# Patient Record
Sex: FEMALE | Race: WHITE | NOT HISPANIC OR LATINO | Employment: OTHER | URBAN - METROPOLITAN AREA
[De-identification: names, ages, dates, MRNs, and addresses within clinical notes are randomized per-mention and may not be internally consistent; named-entity substitution may affect disease eponyms.]

---

## 2023-06-06 LAB — HBA1C MFR BLD HPLC: 7.2 %

## 2023-10-02 ENCOUNTER — OFFICE VISIT (OUTPATIENT)
Dept: OBGYN CLINIC | Facility: CLINIC | Age: 68
End: 2023-10-02
Payer: COMMERCIAL

## 2023-10-02 VITALS
HEIGHT: 58 IN | WEIGHT: 207 LBS | DIASTOLIC BLOOD PRESSURE: 78 MMHG | SYSTOLIC BLOOD PRESSURE: 132 MMHG | BODY MASS INDEX: 43.45 KG/M2

## 2023-10-02 DIAGNOSIS — Z12.31 ENCOUNTER FOR SCREENING MAMMOGRAM FOR MALIGNANT NEOPLASM OF BREAST: ICD-10-CM

## 2023-10-02 DIAGNOSIS — Z12.4 SCREENING FOR CERVICAL CANCER: ICD-10-CM

## 2023-10-02 DIAGNOSIS — N95.0 PMB (POSTMENOPAUSAL BLEEDING): Primary | ICD-10-CM

## 2023-10-02 PROCEDURE — 99203 OFFICE O/P NEW LOW 30 MIN: CPT | Performed by: OBSTETRICS & GYNECOLOGY

## 2023-10-02 PROCEDURE — 58100 BIOPSY OF UTERUS LINING: CPT | Performed by: OBSTETRICS & GYNECOLOGY

## 2023-10-02 RX ORDER — CARVEDILOL 3.12 MG/1
3.12 TABLET ORAL 2 TIMES DAILY
COMMUNITY
Start: 2023-07-26

## 2023-10-02 RX ORDER — CITALOPRAM HYDROBROMIDE 10 MG/1
10 TABLET ORAL DAILY
COMMUNITY
Start: 2023-09-23 | End: 2023-10-02

## 2023-10-02 RX ORDER — DIVALPROEX SODIUM 500 MG/1
500 TABLET, EXTENDED RELEASE ORAL 2 TIMES DAILY
COMMUNITY
Start: 2023-09-23

## 2023-10-02 RX ORDER — ALLOPURINOL 100 MG/1
100 TABLET ORAL 2 TIMES DAILY
COMMUNITY
Start: 2023-09-11

## 2023-10-02 RX ORDER — ESCITALOPRAM OXALATE 20 MG/1
20 TABLET ORAL DAILY
COMMUNITY

## 2023-10-02 RX ORDER — TIRZEPATIDE 5 MG/.5ML
INJECTION, SOLUTION SUBCUTANEOUS
COMMUNITY
Start: 2023-09-25

## 2023-10-02 RX ORDER — FOLIC ACID 1 MG/1
1000 TABLET ORAL DAILY
COMMUNITY
Start: 2023-04-18

## 2023-10-02 RX ORDER — DIBASIC SODIUM PHOSPHATE, MONOBASIC POTASSIUM PHOSPHATE AND MONOBASIC SODIUM PHOSPHATE 852; 155; 130 MG/1; MG/1; MG/1
1 TABLET ORAL 2 TIMES DAILY
COMMUNITY
Start: 2023-08-09

## 2023-10-02 RX ORDER — AMLODIPINE BESYLATE 2.5 MG/1
2.5 TABLET ORAL DAILY
COMMUNITY

## 2023-10-02 RX ORDER — ATORVASTATIN CALCIUM 20 MG/1
20 TABLET, FILM COATED ORAL DAILY
COMMUNITY
Start: 2023-09-15

## 2023-10-02 RX ORDER — QUETIAPINE FUMARATE 100 MG/1
100 TABLET, FILM COATED ORAL DAILY
COMMUNITY
Start: 2023-09-23

## 2023-10-02 RX ORDER — FUROSEMIDE 40 MG/1
40 TABLET ORAL DAILY
COMMUNITY
Start: 2023-08-15

## 2023-10-02 RX ORDER — TIRZEPATIDE 2.5 MG/.5ML
INJECTION, SOLUTION SUBCUTANEOUS
COMMUNITY
Start: 2023-08-30

## 2023-10-02 RX ORDER — POTASSIUM CHLORIDE 1500 MG/1
1 TABLET, EXTENDED RELEASE ORAL 2 TIMES DAILY WITH MEALS
COMMUNITY
Start: 2023-09-18

## 2023-10-02 RX ORDER — ALBUTEROL SULFATE 90 UG/1
AEROSOL, METERED RESPIRATORY (INHALATION)
COMMUNITY

## 2023-10-02 RX ORDER — GABAPENTIN 600 MG/1
600 TABLET ORAL 3 TIMES DAILY
COMMUNITY
Start: 2023-09-03

## 2023-10-02 RX ORDER — OMEPRAZOLE 20 MG/1
20 CAPSULE, DELAYED RELEASE ORAL DAILY
COMMUNITY
Start: 2023-07-05
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
## 2023-10-02 NOTE — PATIENT INSTRUCTIONS
Pelvic Ultrasound   WHAT YOU NEED TO KNOW:   What do I need to know about a pelvic ultrasound? A pelvic ultrasound is a test that uses sound waves to look at your uterus, ovaries, or other pelvic organs. It can help your healthcare provider diagnose, monitor, or treat a medical condition. It may also be done during pregnancy to see your unborn baby. A pelvic ultrasound does not expose you or your baby to radiation. How do I prepare for a pelvic ultrasound? Your healthcare provider will talk to you about the test. You will need to drink 5 to 6 glasses of water 1 to 2 hours before your test. After you drink the water, do not urinate until you are told it is okay to do so. A full bladder will help your healthcare provider see your uterus and ovaries better. What will happen during a pelvic ultrasound? You will lie on a table. Your healthcare provider will put gel on your lower abdomen. He or she will then move a device called a transducer over that area. The transducer uses sound waves to make images of your uterus, pelvic organs, or unborn baby. You may be asked to move into other positions so your healthcare provider can get better images. When he or she is done, you will be able to urinate. Your healthcare provider may also need to insert a transducer into your vagina. This is called a transvaginal ultrasound. It is done to help your healthcare provider see your uterus and ovaries better. CARE AGREEMENT:   You have the right to help plan your care. Learn about your health condition and how it may be treated. Discuss treatment options with your healthcare providers to decide what care you want to receive. You always have the right to refuse treatment. The above information is an  only. It is not intended as medical advice for individual conditions or treatments. Talk to your doctor, nurse or pharmacist before following any medical regimen to see if it is safe and effective for you.   © Copyright [employer] 2023 Information is for End User's use only and may not be sold, redistributed or otherwise used for commercial purposes.

## 2023-10-04 LAB
CYTOLOGIST CVX/VAG CYTO: NORMAL
DX ICD CODE: NORMAL
HPV GENOTYPE REFLEX: NORMAL
HPV I/H RISK 4 DNA CVX QL PROBE+SIG AMP: NEGATIVE
OTHER STN SPEC: NORMAL
PATH REPORT.FINAL DX SPEC: NORMAL
SL AMB NOTE:: NORMAL
SL AMB SPECIMEN ADEQUACY: NORMAL
SL AMB TEST METHODOLOGY: NORMAL

## 2023-10-12 LAB
PATH REPORT.COMMENTS IMP SPEC: NORMAL
PATH REPORT.SITE OF ORIGIN SPEC: NORMAL
PAYMENT PROCEDURE: NORMAL
SL AMB .: NORMAL

## 2023-10-13 ENCOUNTER — TELEPHONE (OUTPATIENT)
Dept: OBGYN CLINIC | Facility: CLINIC | Age: 68
End: 2023-10-13

## 2023-10-13 ENCOUNTER — TELEPHONE (OUTPATIENT)
Dept: HEMATOLOGY ONCOLOGY | Facility: CLINIC | Age: 68
End: 2023-10-13

## 2023-10-13 DIAGNOSIS — N95.0 PMB (POSTMENOPAUSAL BLEEDING): Primary | ICD-10-CM

## 2023-10-13 NOTE — TELEPHONE ENCOUNTER
TC to pt  Instructed her and her  son Taisha Justice that   abnormal cells were identified on the  biopsy  and  not  conclusive and   fu is needed . Pt still with vaginal bleeding  and feels she has  rectal bleeding. Changing her   pad  6 x a day. DW  pt and Eribertoolas that a referral was placed to a  gyn Oncologist  Yvan Christine at  [facility]  in  [place]. Hope line number is [phone]. US is scheduled for  next week. Taisha Justice verbalized they may  look into seeing a  gyn oncologist at  [facility]. Instructed him to  contact  her primary and  they can request records. P:t aware  fu is needed.

## 2023-10-13 NOTE — TELEPHONE ENCOUNTER
Patient Call    Who are you speaking with? Child    If it is not the patient, are they listed on an active communication consent form? Yes   What is the reason for this call? He called to schedule appt with gyn onc. I advised pt chart is under review and we will call to schedule when clinical staff is done reviewing   Does this require a call back? N/A   If a call back is required, please list best call back number N/a   If a call back is required, advise that a message will be forwarded to their care team and someone will return their call as soon as possible. Did you relay this information to the patient?  N/A

## 2023-10-13 NOTE — TELEPHONE ENCOUNTER
TC to patient   son Sunshine Wells  answered. Pt still asleep. Instructed  son I will call her back later. We do need to follow up  on her biopsy.

## 2023-10-16 ENCOUNTER — TELEPHONE (OUTPATIENT)
Dept: HEMATOLOGY ONCOLOGY | Facility: CLINIC | Age: 68
End: 2023-10-16

## 2023-10-16 NOTE — TELEPHONE ENCOUNTER
I called Connor Hedrick in response to a referral that was received for patient to establish care with Gynecologic Oncology. Outreach was made to schedule a consultation. A consultation was scheduled for patient during this call.  Patient is scheduled on 11/9/23 at 1:00PM with Dr Surya Alexander at the [address]

## 2023-11-03 ENCOUNTER — TELEPHONE (OUTPATIENT)
Dept: OBGYN CLINIC | Facility: CLINIC | Age: 68
End: 2023-11-03

## 2023-11-03 NOTE — TELEPHONE ENCOUNTER
TC to pt  verified that she has a fu visit on  11/9 with  Dr Cathy Selby. + US w thickened lining and  suspicious  collection in the uterus. Pt is hving off and on  bleeding. Rest and fluids.

## 2023-11-08 ENCOUNTER — TELEPHONE (OUTPATIENT)
Dept: HEMATOLOGY ONCOLOGY | Facility: CLINIC | Age: 68
End: 2023-11-08

## 2023-11-08 PROBLEM — N85.02 EIN (ENDOMETRIAL INTRAEPITHELIAL NEOPLASIA): Status: ACTIVE | Noted: 2023-11-08

## 2023-11-08 RX ORDER — SODIUM CHLORIDE, SODIUM LACTATE, POTASSIUM CHLORIDE, CALCIUM CHLORIDE 600; 310; 30; 20 MG/100ML; MG/100ML; MG/100ML; MG/100ML
125 INJECTION, SOLUTION INTRAVENOUS CONTINUOUS
OUTPATIENT
Start: 2023-11-08

## 2023-11-08 RX ORDER — ACETAMINOPHEN 325 MG/1
975 TABLET ORAL ONCE
OUTPATIENT
Start: 2023-11-08 | End: 2023-11-08

## 2023-11-08 NOTE — TELEPHONE ENCOUNTER
Appointment Confirmation   Who are you speaking with? Patient   If it is not the patient, are they listed on an active communication consent form? N/A   Which provider is the appointment scheduled with? Dr. Bradly Trujillo   When is the appointment scheduled? Please list date and time 11/09/2023 @1PM   At which location is the appointment scheduled to take place? [place]   Did caller verbalize understanding of appointment details?  Yes

## 2023-11-09 ENCOUNTER — CONSULT (OUTPATIENT)
Age: 68
End: 2023-11-09
Payer: COMMERCIAL

## 2023-11-09 VITALS
OXYGEN SATURATION: 92 % | DIASTOLIC BLOOD PRESSURE: 82 MMHG | HEART RATE: 92 BPM | BODY MASS INDEX: 42.78 KG/M2 | RESPIRATION RATE: 18 BRPM | SYSTOLIC BLOOD PRESSURE: 142 MMHG | HEIGHT: 58 IN | WEIGHT: 203.8 LBS | TEMPERATURE: 97.5 F

## 2023-11-09 DIAGNOSIS — N95.0 PMB (POSTMENOPAUSAL BLEEDING): ICD-10-CM

## 2023-11-09 DIAGNOSIS — N85.02 EIN (ENDOMETRIAL INTRAEPITHELIAL NEOPLASIA): Primary | ICD-10-CM

## 2023-11-09 DIAGNOSIS — J43.8 OTHER EMPHYSEMA (HCC): ICD-10-CM

## 2023-11-09 PROCEDURE — 99205 OFFICE O/P NEW HI 60 MIN: CPT | Performed by: OBSTETRICS & GYNECOLOGY

## 2023-11-09 RX ORDER — NYSTATIN 100000 [USP'U]/G
POWDER TOPICAL
COMMUNITY

## 2023-11-09 RX ORDER — CITALOPRAM HYDROBROMIDE 10 MG/1
10 TABLET ORAL DAILY
COMMUNITY

## 2023-11-09 NOTE — PATIENT INSTRUCTIONS
1. Nothing to eat or drink after midnight prior to the operation. 2.  Please avoid ibuprofen, aspirin, fish oil for 7 days prior to surgery  3. Please take your Prilosec, gabapentin, Depakote, Coreg, Celexa the morning of surgery with a sip of water. Do not take any other medications the morning of surgery. 4.  Please stop metformin approximately 24 hours prior to surgery. Metformin can be resumed after surgery.

## 2023-11-09 NOTE — ASSESSMENT & PLAN NOTE
47-year-old with morbid obesity, BMI 43 kg per metered squared, postmenopausal spotting with endometrial biopsy demonstrating atypical glandular proliferation cannot rule out more serious lesion. I reviewed the ultrasound demonstrated 2.6 cm endometrial thickness/mass. Her performance status is 3.  1.  I reviewed the pathology report in detail with the patient and her family members. She understands the pathology report is not diagnostic of cancer, however, there is a suspicion. 2.  I reviewed the transvaginal pelvic ultrasound imaging which is also suggestive of possible endometrial cancer. 3.  I discussed the risks and benefits of robotic assisted total laparoscopic hysterectomy, bilateral salpingo-oophorectomy, possible lymph node dissection, possible exploratory laparotomy and all other indicated procedures. She understands the risks and benefits of the operation including the potential risks of prolonged intubation, possible prolonged hospital stay due to her underlying lung disease and she agrees to proceed as outlined. Consent for surgery was obtained by me in the office. 4.  She understands that adjuvant therapy is prescribed based on surgical stage and the possible adjuvant treatment options include radiation, chemotherapy, combination treatment, observation. Thank you for the courtesy of this consultation. All questions were answered by the end of the visit.

## 2023-11-10 ENCOUNTER — TELEPHONE (OUTPATIENT)
Dept: OBGYN CLINIC | Facility: CLINIC | Age: 68
End: 2023-11-10
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
## 2023-11-10 NOTE — TELEPHONE ENCOUNTER
STEFAN Llamas Karen cx her appt with Dr Bocanegra and she will be getting a hysterectomy with Dr Leonard.

## 2023-11-15 ENCOUNTER — TELEPHONE (OUTPATIENT)
Dept: HEMATOLOGY ONCOLOGY | Facility: CLINIC | Age: 68
End: 2023-11-15

## 2023-11-15 NOTE — TELEPHONE ENCOUNTER
Patient Call    Who are you speaking with? Physician Office    If it is not the patient, are they listed on an active communication consent form? N/A   What is the reason for this call? They need Dr. Blaine Phoenix last office note faxed to [phone]   Does this require a call back? N/A   If a call back is required, please list best call back number N/a   If a call back is required, advise that a message will be forwarded to their care team and someone will return their call as soon as possible. Did you relay this information to the patient?  N/A

## 2023-11-16 ENCOUNTER — LAB REQUISITION (OUTPATIENT)
Dept: LAB | Facility: HOSPITAL | Age: 68
End: 2023-11-16
Payer: COMMERCIAL

## 2023-11-16 ENCOUNTER — APPOINTMENT (OUTPATIENT)
Dept: LAB | Facility: HOSPITAL | Age: 68
End: 2023-11-16
Payer: COMMERCIAL

## 2023-11-16 DIAGNOSIS — N85.02 ENDOMETRIAL INTRAEPITHELIAL NEOPLASIA (EIN): ICD-10-CM

## 2023-11-16 DIAGNOSIS — N95.0 POSTMENOPAUSAL BLEEDING: ICD-10-CM

## 2023-11-16 DIAGNOSIS — N85.02 ENDOMETRIAL HYPERPLASIA WITH ATYPIA: ICD-10-CM

## 2023-11-16 LAB
ALBUMIN SERPL BCP-MCNC: 4 G/DL (ref 3.5–5)
ALP SERPL-CCNC: 92 U/L (ref 34–104)
ALT SERPL W P-5'-P-CCNC: 12 U/L (ref 7–52)
ANION GAP SERPL CALCULATED.3IONS-SCNC: 8 MMOL/L
AST SERPL W P-5'-P-CCNC: 23 U/L (ref 13–39)
BILIRUB SERPL-MCNC: 0.49 MG/DL (ref 0.2–1)
BUN SERPL-MCNC: 15 MG/DL (ref 5–25)
CALCIUM SERPL-MCNC: 9.5 MG/DL (ref 8.4–10.2)
CHLORIDE SERPL-SCNC: 105 MMOL/L (ref 96–108)
CO2 SERPL-SCNC: 31 MMOL/L (ref 21–32)
CREAT SERPL-MCNC: 1.19 MG/DL (ref 0.6–1.3)
ERYTHROCYTE [DISTWIDTH] IN BLOOD BY AUTOMATED COUNT: 14 % (ref 11.6–15.1)
EST. AVERAGE GLUCOSE BLD GHB EST-MCNC: 143 MG/DL
GFR SERPL CREATININE-BSD FRML MDRD: 46 ML/MIN/1.73SQ M
GLUCOSE P FAST SERPL-MCNC: 135 MG/DL (ref 65–99)
HBA1C MFR BLD: 6.6 %
HCT VFR BLD AUTO: 32.7 % (ref 34.8–46.1)
HGB BLD-MCNC: 10.3 G/DL (ref 11.5–15.4)
INR PPP: 0.98 (ref 0.84–1.19)
MCH RBC QN AUTO: 32 PG (ref 26.8–34.3)
MCHC RBC AUTO-ENTMCNC: 31.5 G/DL (ref 31.4–37.4)
MCV RBC AUTO: 102 FL (ref 82–98)
PLATELET # BLD AUTO: 146 THOUSANDS/UL (ref 149–390)
PMV BLD AUTO: 10.9 FL (ref 8.9–12.7)
POTASSIUM SERPL-SCNC: 4.5 MMOL/L (ref 3.5–5.3)
PROT SERPL-MCNC: 6.8 G/DL (ref 6.4–8.4)
PROTHROMBIN TIME: 13.4 SECONDS (ref 11.6–14.5)
RBC # BLD AUTO: 3.22 MILLION/UL (ref 3.81–5.12)
SODIUM SERPL-SCNC: 144 MMOL/L (ref 135–147)
WBC # BLD AUTO: 5.5 THOUSAND/UL (ref 4.31–10.16)

## 2023-11-16 PROCEDURE — 36415 COLL VENOUS BLD VENIPUNCTURE: CPT

## 2023-11-16 PROCEDURE — 86900 BLOOD TYPING SEROLOGIC ABO: CPT | Performed by: OBSTETRICS & GYNECOLOGY

## 2023-11-16 PROCEDURE — 85610 PROTHROMBIN TIME: CPT

## 2023-11-16 PROCEDURE — 80053 COMPREHEN METABOLIC PANEL: CPT

## 2023-11-16 PROCEDURE — 86850 RBC ANTIBODY SCREEN: CPT | Performed by: OBSTETRICS & GYNECOLOGY

## 2023-11-16 PROCEDURE — 85027 COMPLETE CBC AUTOMATED: CPT

## 2023-11-16 PROCEDURE — 83036 HEMOGLOBIN GLYCOSYLATED A1C: CPT

## 2023-11-16 PROCEDURE — 86901 BLOOD TYPING SEROLOGIC RH(D): CPT | Performed by: OBSTETRICS & GYNECOLOGY

## 2023-11-17 LAB
ABO GROUP BLD: NORMAL
BLD GP AB SCN SERPL QL: NEGATIVE
RH BLD: POSITIVE
SPECIMEN EXPIRATION DATE: NORMAL

## 2023-11-20 DIAGNOSIS — Z12.31 ENCOUNTER FOR SCREENING MAMMOGRAM FOR MALIGNANT NEOPLASM OF BREAST: ICD-10-CM

## 2023-11-28 ENCOUNTER — TELEPHONE (OUTPATIENT)
Dept: HEMATOLOGY ONCOLOGY | Facility: CLINIC | Age: 68
End: 2023-11-28

## 2023-11-28 NOTE — TELEPHONE ENCOUNTER
Patient Call    Who are you speaking with? Child    If it is not the patient, are they listed on an active communication consent form? Yes   What is the reason for this call? Patients son, Rosemarie Logan calling to inform Dr. Woodall Lilian all patients presurgical testing is done    Does this require a call back? Only if office needs to   If a call back is required, please list best call back number [phone]   If a call back is required, advise that a message will be forwarded to their care team and someone will return their call as soon as possible. Did you relay this information to the patient?  Yes

## 2023-12-01 PROBLEM — Z12.4 SCREENING FOR CERVICAL CANCER: Status: RESOLVED | Noted: 2023-10-02 | Resolved: 2023-12-01

## 2023-12-08 ENCOUNTER — TELEPHONE (OUTPATIENT)
Dept: HEMATOLOGY ONCOLOGY | Facility: CLINIC | Age: 68
End: 2023-12-08

## 2023-12-08 NOTE — TELEPHONE ENCOUNTER
Patient Call    Who are you speaking with? Physician Office    If it is not the patient, are they listed on an active communication consent form? N/A   What is the reason for this call? She asked for last office note and labs faxed to [phone]   Does this require a call back? N/A   If a call back is required, please list best call back number N/a   If a call back is required, advise that a message will be forwarded to their care team and someone will return their call as soon as possible. Did you relay this information to the patient?  N/A

## 2023-12-08 NOTE — TELEPHONE ENCOUNTER
Tried to fax the chart notes & labs over to Dr. Verner Carter office multiple times. The fax is not going through. I called the doctor's office to verify the correct fax number. I will attempt to refax on Monday.     Dr. Panfilo Nunez  [phone] - Tel.  [phone] - Fax

## 2023-12-13 ENCOUNTER — TELEPHONE (OUTPATIENT)
Dept: HEMATOLOGY ONCOLOGY | Facility: CLINIC | Age: 68
End: 2023-12-13

## 2023-12-13 NOTE — TELEPHONE ENCOUNTER
Patient Call    Who are you speaking with? Patient    If it is not the patient, are they listed on an active communication consent form? N/A   What is the reason for this call? Reba Beckham is calling to speak to the team about possibly postponing the patient's surgery due to lack of clearance. Does this require a call back? Yes   If a call back is required, please list best call back number [phone]   If a call back is required, advise that a message will be forwarded to their care team and someone will return their call as soon as possible. Did you relay this information to the patient?  Yes

## 2023-12-15 ENCOUNTER — TELEPHONE (OUTPATIENT)
Dept: HEMATOLOGY ONCOLOGY | Facility: CLINIC | Age: 68
End: 2023-12-15

## 2023-12-15 ENCOUNTER — TELEPHONE (OUTPATIENT)
Dept: GYNECOLOGIC ONCOLOGY | Facility: CLINIC | Age: 68
End: 2023-12-15

## 2023-12-15 NOTE — TELEPHONE ENCOUNTER
Patient Call    Who are you speaking with? Physician Office    If it is not the patient, are they listed on an active communication consent form? N/A   What is the reason for this call? Pt is scheduled with pulmonary wed 12/20. Pcp will not clear without pulmonary   Does this require a call back? Yes   If a call back is required, please list best call back number [phone]    If a call back is required, advise that a message will be forwarded to their care team and someone will return their call as soon as possible. Did you relay this information to the patient?  Yes

## 2023-12-15 NOTE — TELEPHONE ENCOUNTER
Spoke to PCP's office and spoke to Dr. Thierno Astorga about patient not being cleared for surgery at this time.  Will remove from the surgery schedule on 12/19/23

## 2023-12-20 ENCOUNTER — TELEPHONE (OUTPATIENT)
Dept: HEMATOLOGY ONCOLOGY | Facility: CLINIC | Age: 68
End: 2023-12-20

## 2023-12-20 NOTE — TELEPHONE ENCOUNTER
Patient Call    Who are you speaking with? Child    If it is not the patient, are they listed on an active communication consent form? N/A   What is the reason for this call? He asked if they could get a sooner surgery date   Does this require a call back? Yes   If a call back is required, please list best call back number [phone]    If a call back is required, advise that a message will be forwarded to their care team and someone will return their call as soon as possible.   Did you relay this information to the patient? Yes

## 2023-12-26 ENCOUNTER — TELEPHONE (OUTPATIENT)
Dept: HEMATOLOGY ONCOLOGY | Facility: CLINIC | Age: 68
End: 2023-12-26

## 2023-12-26 NOTE — TELEPHONE ENCOUNTER
Appointment Change  Cancel, Reschedule, Change to Virtual      Who are you speaking with? Patient   If it is not the patient, is the caller listed on the communication consent form? Yes   Which provider is the appointment scheduled with? Dr. Leonard   When was the original appointment scheduled?    Please list date and time 1/4/24   At which location is the appointment scheduled to take place? [place]   Was the appointment rescheduled?     Was the appointment changed from an in person visit to a virtual visit?    If so, please list the details of the change. 2/1/24   What is the reason for the appointment change? provider       Was STAR transport scheduled? No   Does ST[facility] transport need to be scheduled for the new visit (if applicable) No   Does the patient need an infusion appointment rescheduled? No   Does the patient have an upcoming infusion appointment scheduled? If so, when? No   Is the patient undergoing chemotherapy? No   For appointments cancelled with less than 24 hours:  Was the no-show policy reviewed? Yes

## 2024-01-12 ENCOUNTER — ANESTHESIA EVENT (OUTPATIENT)
Dept: PERIOP | Facility: HOSPITAL | Age: 69
End: 2024-01-12
Payer: COMMERCIAL

## 2024-01-12 NOTE — PRE-PROCEDURE INSTRUCTIONS
Pre-Surgery Instructions:   Medication Instructions    albuterol (PROVENTIL HFA,VENTOLIN HFA) 90 mcg/act inhaler Continue as prescribed    allopurinol (ZYLOPRIM) 100 mg tablet Take Day of Surgery; Continue to take as prescribed including DOS with a small sip of water, unless usually taken at night     amLODIPine (NORVASC) 2.5 mg tablet Take Day of Surgery; Continue to take as prescribed including DOS with a small sip of water, unless usually taken at night     atorvastatin (LIPITOR) 20 mg tablet Take Day of Surgery; Continue to take as prescribed including DOS with a small sip of water, unless usually taken at night     carvedilol (COREG) 3.125 mg tablet Take Day of Surgery; Continue to take as prescribed including DOS with a small sip of water, unless usually taken at night     Cholecalciferol 25 MCG (1000 UT) tablet Avoid 1 week prior to surgery      citalopram (CeleXA) 10 mg tablet Take Day of Surgery; Continue to take as prescribed including DOS with a small sip of water, unless usually taken at night     divalproex sodium (DEPAKOTE ER) 500 mg 24 hr tablet Take Day of Surgery; Continue to take as prescribed including DOS with a small sip of water, unless usually taken at night     escitalopram (LEXAPRO) 20 mg tablet Take Day of Surgery; Continue to take as prescribed including DOS with a small sip of water, unless usually taken at night     folic acid (FOLVITE) 1 mg tablet Avoid 1 week prior to surgery , unless prescribed by surgeon    furosemide (LASIX) 40 mg tablet Do not take day of surgery; continue as prescribed excluding DOS     gabapentin (NEURONTIN) 600 MG tablet Take Day of Surgery; Continue to take as prescribed including DOS with a small sip of water, unless usually taken at night     K Phos [place]-Sod Phos Di & Mono (Phospha 250 Neutral) 155-852-130 MG TABS Avoid 1 week prior to surgery , unless prescribed by surgeon    metFORMIN (GLUCOPHAGE) 500 mg tablet Do not take day of surgery; continue as  prescribed excluding DOS     Mounjaro 5 MG/0.5ML Hold for 1 week    nystatin powder Do not take day of surgery; continue as prescribed excluding DOS     omeprazole (PriLOSEC) 20 mg delayed release capsule Take Day of Surgery; Continue to take as prescribed including DOS with a small sip of water, unless usually taken at night     Potassium Chloride ER 20 MEQ TBCR Do not take day of surgery; continue as prescribed excluding DOS     QUEtiapine (SEROquel) 100 mg tablet Take Day of Surgery; Continue to take as prescribed including DOS with a small sip of water, unless usually taken at night     Medication instructions for day surgery reviewed. Please use only a sip of water to take your instructed medications. Avoid all over the counter vitamins, supplements and NSAIDS for one week prior to surgery per anesthesia guidelines. Tylenol is ok to take as needed.     You will receive a call one business day prior to surgery with an arrival time and hospital directions. If your surgery is scheduled on a Monday, the hospital will be calling you on the Friday prior to your surgery. If you have not heard from anyone by 8pm, please call the hospital supervisor through the hospital  at [phone]. ([facility] [phone]).    Do not eat or drink anything after midnight the night before your surgery, including candy, mints, lifesavers, or chewing gum. Do not drink alcohol 24hrs before your surgery. Try not to smoke at least 24hrs before your surgery.       Follow the pre surgery showering instructions as listed in the “My Surgical Experience Booklet” or otherwise provided by your surgeon's office. Do not use a blade to shave the surgical area 1 week before surgery. It is okay to use a clean electric clippers up to 24 hours before surgery. Do not apply any lotions, creams, including makeup, cologne, deodorant, or perfumes after showering on the day of your surgery. Do not use dry shampoo, hair spray, hair gel, or any type of  hair products.     No contact lenses, eye make-up, or artificial eyelashes. Remove nail polish, including gel polish, and any artificial, gel, or acrylic nails if possible. Remove all jewelry including rings and body piercing jewelry.     Wear causal clothing that is easy to take on and off. Consider your type of surgery.    Keep any valuables, jewelry, piercings at home. Please bring any specially ordered equipment (sling, braces) if indicated.    Arrange for a responsible person to drive you to and from the hospital on the day of your surgery. Visitor Guidelines discussed.     Call the surgeon's office with any new illnesses, exposures, or additional questions prior to surgery.    Please reference your “My Surgical Experience Booklet” for additional information to prepare for your upcoming surgery.

## 2024-01-22 ENCOUNTER — TELEPHONE (OUTPATIENT)
Dept: HEMATOLOGY ONCOLOGY | Facility: CLINIC | Age: 69
End: 2024-01-22

## 2024-01-22 PROBLEM — I10 ESSENTIAL HYPERTENSION: Status: ACTIVE | Noted: 2024-01-22

## 2024-01-22 PROBLEM — E11.9 DIABETES MELLITUS (HCC): Status: ACTIVE | Noted: 2024-01-22

## 2024-01-22 PROBLEM — E78.5 HYPERLIPIDEMIA: Status: ACTIVE | Noted: 2024-01-22

## 2024-01-22 PROBLEM — F31.9 BIPOLAR 1 DISORDER (HCC): Status: ACTIVE | Noted: 2024-01-22

## 2024-01-22 NOTE — ASSESSMENT & PLAN NOTE
69yo with PMB and endometrial biopsy demonstrating atypical glandular proliferation now POD 1 from robotic assisted total laparoscopic hysterectomy, bilateral salpingo-oophorectomy, cysto    - FEN: Carb-controlled diet, zofran PRN, colace BID  - Pain: Tylenol scheduled, oxy 2.5/5mg PRN, Dilaudid PRN for breakthrough  - CV: monitor fluid status  - Heme: f/u AM labs  - : s/p lane in operating room  - Lines: peripheral IV

## 2024-01-22 NOTE — ASSESSMENT & PLAN NOTE
On 4L O2 requirement at home  Bipap at night   Continue home albuterol PRN   Incentive spirometry q1h while awake

## 2024-01-22 NOTE — DISCHARGE INSTRUCTIONS
[facility] - Gynecologic Oncology  Miah Duran, Nupur Granados  [phone]    Hysterectomy Discharge Instructions    WHAT YOU NEED TO KNOW:   A hysterectomy is surgery to remove your uterus. Your ovaries, fallopian tubes, cervix, or part of your vagina may also need to be removed. The organs and tissue that will be removed depends on your medical condition.  After a hysterectomy, you will not be able to become pregnant.  If your ovaries are removed, you will go through menopause if you have not already.    DISCHARGE INSTRUCTIONS:   Contact your doctor at the number above if:   You have a fever over 101o.  You have nausea or are vomiting that does not improve after a light meal.   Your pain is getting worse, even after you take medicine.   You feel pain or burning when you urinate, or you have trouble urinating.   You have pus or a foul-smelling odor coming from your vagina.    Your wound is red, swollen, or draining pus.  You see new or an increased amount of bright red blood coming from your vagina or your incisions.   You have questions or concerns about your condition or care.    Seek care immediately:   Your arm or leg feels warm, tender, and painful. It may look swollen and red.  You have increasing abdominal or pelvic pain.   You have heavy vaginal bleeding that fills 1 or more sanitary pads in 1 hour.    Call 911 for any of the following:   You feel lightheaded, short of breath, and have chest pain.   You cough up blood.    Medicines: You may need any of the following:  Prescription medicine may be given. You may receive a prescription for pain medication or be advised to use over the counter (OTC) pain medication such as acetaminophen (Tylenol) or ibuprofen (Advil, Motrin). Ask your healthcare provider how to take this medicine safely.  NSAIDs , such as ibuprofen, help decrease swelling, pain, and fever. NSAIDs can cause stomach bleeding or kidney problems in certain  people. If you take blood thinner medicine, always ask your healthcare provider if NSAIDs are safe for you. Always read the medicine label and follow directions.   Stool softeners help treat or prevent constipation.    Take your medicine as directed. Contact your healthcare provider if you think your medicine is not helping or if you have side effects. Tell him or her if you are allergic to any medicine. Keep a list of the medicines, vitamins, and herbs you take. Include the amounts, and when and why you take them. Bring the list or the pill bottles to follow-up visits. Carry your medicine list with you in case of an emergency.    Activity:   Rest as needed. Get up and move around as directed to help prevent blood clots. Start with short walks and slowly increase the distance every day. Limit the number of times you climb stairs to 2 times each day for the first week. Plan most of your daily activities on one level of your home.      Do not lift objects heavier than 10 pounds for 6 weeks. Avoid strenuous activity for 2 weeks.      Do not strain during bowel movements. High-fiber foods and extra liquids can help you prevent constipation. Examples of high-fiber foods are fruit and bran. Prune juice and water are good liquids to drink.      Do not have sex, use tampons, or douche for up to 8 weeks.     You may shower as soon as the day after surgery.  Tub baths can be taken starting 2 weeks after surgery.Do not go into pools or hot tubs until cleared by your doctor.      Ask when it is safe for you to drive. It is generally safe to drive after 2 weeks and when no longer taking prescription pain medication.    Ask when you may return to work and to other regular activities.    Wound care: Care for your abdominal incisions as directed. Carefully wash around the wound with soap and water. If you have Hibiclens or medicated soap that you were instructed to use before surgery, you may use that to wash with for up to 2 days  after surgery.  If not, any mild non-scented, non-abrasive soap is safe.  It is okay to let the soap and water run over your incision. Do not scrub your incision. Dry the area and put on new, clean bandages as directed. Change your bandages when they get wet or dirty. If you have strips of medical tape, let them fall off on their own. It may take 7 to 14 days for them to fall off. Check your incision every day for redness, swelling, or pus.   Deep breathing: Take deep breaths and cough 10 times each hour. This will decrease your risk for a lung infection. Take a deep breath and hold it for as long as you can. Let the air out and then cough strongly. Deep breaths help open your airway. You may be given an incentive spirometer to help you take deep breaths. Put the plastic piece in your mouth and take a slow, deep breath, then let the air out and cough. Repeat these steps 10 times every hour.   Get support: This surgery may be life-changing for you and your family. You will no longer be able to get pregnant. Sudden changes in the levels of your hormones may occur and cause mood swings and depression. You may feel angry, sad, or frightened, or cry frequently and unexpectedly. These feelings are normal. Talk to your healthcare provider about where you can get support. You can also ask if hormone replacement medicine is right for you.   Follow up with your healthcare provider or gynecologist as directed: You may need to return to have stitches removed, and for other tests. Write down your questions so you remember to ask them during your visits.      © 2017 [employer] Information is for End User's use only and may not be sold, redistributed or otherwise used for commercial purposes. All illustrations and images included in CareNotes® are the copyrighted property of [employer]., Inc. or [employer].  The above information is an  only. It is not intended as medical advice for  individual conditions or treatments. Talk to your doctor, nurse or pharmacist before following any medical regimen to see if it is safe and effective for you.

## 2024-01-22 NOTE — TELEPHONE ENCOUNTER
Appointment Confirmation   Who are you speaking with? Child   If it is not the patient, are they listed on an active communication consent form? N/A   Which provider is the appointment scheduled with?  Dr. Leonard   When is the appointment scheduled?  Please list date and time 02/01/2024 @ 2PM    At which location is the appointment scheduled to take place? [place]   Did caller verbalize understanding of appointment details? Yes

## 2024-01-23 ENCOUNTER — ANESTHESIA (OUTPATIENT)
Dept: PERIOP | Facility: HOSPITAL | Age: 69
End: 2024-01-23
Payer: COMMERCIAL

## 2024-01-23 ENCOUNTER — HOSPITAL ENCOUNTER (OUTPATIENT)
Facility: HOSPITAL | Age: 69
Setting detail: OUTPATIENT SURGERY
Discharge: HOME | End: 2024-01-24
Attending: OBSTETRICS & GYNECOLOGY | Admitting: OBSTETRICS & GYNECOLOGY
Payer: COMMERCIAL

## 2024-01-23 DIAGNOSIS — N95.0 PMB (POSTMENOPAUSAL BLEEDING): Primary | ICD-10-CM

## 2024-01-23 DIAGNOSIS — N85.02 EIN (ENDOMETRIAL INTRAEPITHELIAL NEOPLASIA): ICD-10-CM

## 2024-01-23 LAB
ABO GROUP BLD: NORMAL
BASE EXCESS BLDA CALC-SCNC: -1.5 MMOL/L
BLD GP AB SCN SERPL QL: NEGATIVE
GLUCOSE SERPL-MCNC: 159 MG/DL (ref 65–140)
GLUCOSE SERPL-MCNC: 178 MG/DL (ref 65–140)
GLUCOSE SERPL-MCNC: 179 MG/DL (ref 65–140)
GLUCOSE SERPL-MCNC: 207 MG/DL (ref 65–140)
HCO3 BLDA-SCNC: 26.8 MMOL/L (ref 22–28)
O2 CT BLDA-SCNC: 15.5 ML/DL (ref 16–23)
OTHER FIO2: ABNORMAL %
OXYHGB MFR BLDA: 92.4 % (ref 94–97)
PCO2 BLDA: 62.8 MM HG (ref 36–44)
PH BLDA: 7.25 [PH] (ref 7.35–7.45)
PO2 BLDA: 79.1 MM HG (ref 75–129)
RH BLD: POSITIVE
SPECIMEN EXPIRATION DATE: NORMAL
SPECIMEN SOURCE: ABNORMAL

## 2024-01-23 PROCEDURE — NC001 PR NO CHARGE: Performed by: PHYSICIAN ASSISTANT

## 2024-01-23 PROCEDURE — S2900 ROBOTIC SURGICAL SYSTEM: HCPCS | Performed by: OBSTETRICS & GYNECOLOGY

## 2024-01-23 PROCEDURE — 86901 BLOOD TYPING SEROLOGIC RH(D): CPT | Performed by: OBSTETRICS & GYNECOLOGY

## 2024-01-23 PROCEDURE — 88309 TISSUE EXAM BY PATHOLOGIST: CPT | Performed by: PATHOLOGY

## 2024-01-23 PROCEDURE — 88341 IMHCHEM/IMCYTCHM EA ADD ANTB: CPT | Performed by: PATHOLOGY

## 2024-01-23 PROCEDURE — 82948 REAGENT STRIP/BLOOD GLUCOSE: CPT

## 2024-01-23 PROCEDURE — NC001 PR NO CHARGE: Performed by: OBSTETRICS & GYNECOLOGY

## 2024-01-23 PROCEDURE — 82805 BLOOD GASES W/O2 SATURATION: CPT | Performed by: NURSE ANESTHETIST, CERTIFIED REGISTERED

## 2024-01-23 PROCEDURE — 86900 BLOOD TYPING SEROLOGIC ABO: CPT | Performed by: OBSTETRICS & GYNECOLOGY

## 2024-01-23 PROCEDURE — 58571 TLH W/T/O 250 G OR LESS: CPT | Performed by: OBSTETRICS & GYNECOLOGY

## 2024-01-23 PROCEDURE — 88342 IMHCHEM/IMCYTCHM 1ST ANTB: CPT | Performed by: PATHOLOGY

## 2024-01-23 PROCEDURE — 86850 RBC ANTIBODY SCREEN: CPT | Performed by: OBSTETRICS & GYNECOLOGY

## 2024-01-23 RX ORDER — ALBUTEROL SULFATE 90 UG/1
2 AEROSOL, METERED RESPIRATORY (INHALATION) EVERY 4 HOURS PRN
Status: DISCONTINUED | OUTPATIENT
Start: 2024-01-23 | End: 2024-01-24 | Stop reason: HOSPADM

## 2024-01-23 RX ORDER — INSULIN LISPRO 100 [IU]/ML
1-6 INJECTION, SOLUTION INTRAVENOUS; SUBCUTANEOUS
Status: DISCONTINUED | OUTPATIENT
Start: 2024-01-23 | End: 2024-01-24 | Stop reason: HOSPADM

## 2024-01-23 RX ORDER — PHENYLEPHRINE HCL IN 0.9% NACL 1 MG/10 ML
SYRINGE (ML) INTRAVENOUS AS NEEDED
Status: DISCONTINUED | OUTPATIENT
Start: 2024-01-23 | End: 2024-01-23

## 2024-01-23 RX ORDER — ATORVASTATIN CALCIUM 20 MG/1
20 TABLET, FILM COATED ORAL
Status: DISCONTINUED | OUTPATIENT
Start: 2024-01-23 | End: 2024-01-24 | Stop reason: HOSPADM

## 2024-01-23 RX ORDER — ROCURONIUM BROMIDE 10 MG/ML
INJECTION, SOLUTION INTRAVENOUS AS NEEDED
Status: DISCONTINUED | OUTPATIENT
Start: 2024-01-23 | End: 2024-01-23

## 2024-01-23 RX ORDER — GABAPENTIN 300 MG/1
600 CAPSULE ORAL 4 TIMES DAILY
Status: DISCONTINUED | OUTPATIENT
Start: 2024-01-23 | End: 2024-01-24 | Stop reason: HOSPADM

## 2024-01-23 RX ORDER — FENTANYL CITRATE 50 UG/ML
INJECTION, SOLUTION INTRAMUSCULAR; INTRAVENOUS AS NEEDED
Status: DISCONTINUED | OUTPATIENT
Start: 2024-01-23 | End: 2024-01-23

## 2024-01-23 RX ORDER — CARVEDILOL 3.12 MG/1
3.12 TABLET ORAL 2 TIMES DAILY
Status: DISCONTINUED | OUTPATIENT
Start: 2024-01-23 | End: 2024-01-24 | Stop reason: HOSPADM

## 2024-01-23 RX ORDER — NYSTATIN 100000 [USP'U]/G
POWDER TOPICAL 3 TIMES DAILY
Status: DISCONTINUED | OUTPATIENT
Start: 2024-01-23 | End: 2024-01-24 | Stop reason: HOSPADM

## 2024-01-23 RX ORDER — OXYCODONE HYDROCHLORIDE 5 MG/1
5 TABLET ORAL EVERY 4 HOURS PRN
Qty: 10 TABLET | Refills: 0 | Status: SHIPPED | OUTPATIENT
Start: 2024-01-23 | End: 2024-01-24

## 2024-01-23 RX ORDER — ONDANSETRON 2 MG/ML
4 INJECTION INTRAMUSCULAR; INTRAVENOUS ONCE AS NEEDED
Status: DISCONTINUED | OUTPATIENT
Start: 2024-01-23 | End: 2024-01-23 | Stop reason: HOSPADM

## 2024-01-23 RX ORDER — OXYCODONE HYDROCHLORIDE 5 MG/1
5 TABLET ORAL EVERY 4 HOURS PRN
Status: DISCONTINUED | OUTPATIENT
Start: 2024-01-23 | End: 2024-01-24 | Stop reason: HOSPADM

## 2024-01-23 RX ORDER — OXYCODONE HYDROCHLORIDE 5 MG/1
2.5 TABLET ORAL EVERY 4 HOURS PRN
Status: DISCONTINUED | OUTPATIENT
Start: 2024-01-23 | End: 2024-01-24 | Stop reason: HOSPADM

## 2024-01-23 RX ORDER — ONDANSETRON 2 MG/ML
INJECTION INTRAMUSCULAR; INTRAVENOUS AS NEEDED
Status: DISCONTINUED | OUTPATIENT
Start: 2024-01-23 | End: 2024-01-23

## 2024-01-23 RX ORDER — ONDANSETRON 2 MG/ML
4 INJECTION INTRAMUSCULAR; INTRAVENOUS EVERY 6 HOURS PRN
Status: DISCONTINUED | OUTPATIENT
Start: 2024-01-23 | End: 2024-01-24 | Stop reason: HOSPADM

## 2024-01-23 RX ORDER — SODIUM CHLORIDE, SODIUM LACTATE, POTASSIUM CHLORIDE, CALCIUM CHLORIDE 600; 310; 30; 20 MG/100ML; MG/100ML; MG/100ML; MG/100ML
50 INJECTION, SOLUTION INTRAVENOUS CONTINUOUS
Status: DISCONTINUED | OUTPATIENT
Start: 2024-01-23 | End: 2024-01-23

## 2024-01-23 RX ORDER — PANTOPRAZOLE SODIUM 20 MG/1
20 TABLET, DELAYED RELEASE ORAL
Status: DISCONTINUED | OUTPATIENT
Start: 2024-01-24 | End: 2024-01-24 | Stop reason: HOSPADM

## 2024-01-23 RX ORDER — SODIUM CHLORIDE 9 MG/ML
INJECTION, SOLUTION INTRAVENOUS CONTINUOUS PRN
Status: DISCONTINUED | OUTPATIENT
Start: 2024-01-23 | End: 2024-01-23

## 2024-01-23 RX ORDER — FENTANYL CITRATE/PF 50 MCG/ML
25 SYRINGE (ML) INJECTION
Status: DISCONTINUED | OUTPATIENT
Start: 2024-01-23 | End: 2024-01-23 | Stop reason: HOSPADM

## 2024-01-23 RX ORDER — HYDROMORPHONE HCL/PF 1 MG/ML
SYRINGE (ML) INJECTION AS NEEDED
Status: DISCONTINUED | OUTPATIENT
Start: 2024-01-23 | End: 2024-01-23

## 2024-01-23 RX ORDER — HYDROMORPHONE HCL/PF 1 MG/ML
0.5 SYRINGE (ML) INJECTION
Status: DISCONTINUED | OUTPATIENT
Start: 2024-01-23 | End: 2024-01-24 | Stop reason: HOSPADM

## 2024-01-23 RX ORDER — AMLODIPINE BESYLATE 2.5 MG/1
2.5 TABLET ORAL DAILY
Status: DISCONTINUED | OUTPATIENT
Start: 2024-01-24 | End: 2024-01-24 | Stop reason: HOSPADM

## 2024-01-23 RX ORDER — KETAMINE HCL IN NACL, ISO-OSM 100MG/10ML
SYRINGE (ML) INJECTION AS NEEDED
Status: DISCONTINUED | OUTPATIENT
Start: 2024-01-23 | End: 2024-01-23

## 2024-01-23 RX ORDER — METRONIDAZOLE 500 MG/100ML
INJECTION, SOLUTION INTRAVENOUS CONTINUOUS PRN
Status: DISCONTINUED | OUTPATIENT
Start: 2024-01-23 | End: 2024-01-23

## 2024-01-23 RX ORDER — PROPOFOL 10 MG/ML
INJECTION, EMULSION INTRAVENOUS CONTINUOUS PRN
Status: DISCONTINUED | OUTPATIENT
Start: 2024-01-23 | End: 2024-01-23

## 2024-01-23 RX ORDER — BUPIVACAINE HYDROCHLORIDE 2.5 MG/ML
INJECTION, SOLUTION EPIDURAL; INFILTRATION; INTRACAUDAL AS NEEDED
Status: DISCONTINUED | OUTPATIENT
Start: 2024-01-23 | End: 2024-01-23 | Stop reason: HOSPADM

## 2024-01-23 RX ORDER — ALBUTEROL SULFATE 2.5 MG/3ML
2.5 SOLUTION RESPIRATORY (INHALATION) ONCE AS NEEDED
Status: DISCONTINUED | OUTPATIENT
Start: 2024-01-23 | End: 2024-01-23 | Stop reason: HOSPADM

## 2024-01-23 RX ORDER — HYDROMORPHONE HCL/PF 1 MG/ML
0.5 SYRINGE (ML) INJECTION
Status: DISCONTINUED | OUTPATIENT
Start: 2024-01-23 | End: 2024-01-23 | Stop reason: HOSPADM

## 2024-01-23 RX ORDER — METOCLOPRAMIDE HYDROCHLORIDE 5 MG/ML
5 INJECTION INTRAMUSCULAR; INTRAVENOUS ONCE AS NEEDED
Status: DISCONTINUED | OUTPATIENT
Start: 2024-01-23 | End: 2024-01-23 | Stop reason: HOSPADM

## 2024-01-23 RX ORDER — ACETAMINOPHEN 325 MG/1
975 TABLET ORAL ONCE
Status: COMPLETED | OUTPATIENT
Start: 2024-01-23 | End: 2024-01-23

## 2024-01-23 RX ORDER — ACETAMINOPHEN 325 MG/1
975 TABLET ORAL EVERY 8 HOURS SCHEDULED
Status: DISCONTINUED | OUTPATIENT
Start: 2024-01-23 | End: 2024-01-24 | Stop reason: HOSPADM

## 2024-01-23 RX ORDER — QUETIAPINE FUMARATE 100 MG/1
100 TABLET, FILM COATED ORAL
Status: DISCONTINUED | OUTPATIENT
Start: 2024-01-23 | End: 2024-01-24 | Stop reason: HOSPADM

## 2024-01-23 RX ORDER — DEXAMETHASONE SODIUM PHOSPHATE 10 MG/ML
INJECTION, SOLUTION INTRAMUSCULAR; INTRAVENOUS AS NEEDED
Status: DISCONTINUED | OUTPATIENT
Start: 2024-01-23 | End: 2024-01-23

## 2024-01-23 RX ORDER — DOCUSATE SODIUM 100 MG/1
100 CAPSULE, LIQUID FILLED ORAL 2 TIMES DAILY
Status: DISCONTINUED | OUTPATIENT
Start: 2024-01-23 | End: 2024-01-24 | Stop reason: HOSPADM

## 2024-01-23 RX ORDER — CEFAZOLIN SODIUM 1 G/3ML
INJECTION, POWDER, FOR SOLUTION INTRAMUSCULAR; INTRAVENOUS AS NEEDED
Status: DISCONTINUED | OUTPATIENT
Start: 2024-01-23 | End: 2024-01-23

## 2024-01-23 RX ORDER — CITALOPRAM HYDROBROMIDE 10 MG/1
10 TABLET ORAL DAILY
Status: DISCONTINUED | OUTPATIENT
Start: 2024-01-24 | End: 2024-01-24 | Stop reason: HOSPADM

## 2024-01-23 RX ORDER — ESCITALOPRAM OXALATE 20 MG/1
20 TABLET ORAL DAILY
Status: DISCONTINUED | OUTPATIENT
Start: 2024-01-24 | End: 2024-01-24 | Stop reason: HOSPADM

## 2024-01-23 RX ORDER — SODIUM CHLORIDE, SODIUM LACTATE, POTASSIUM CHLORIDE, CALCIUM CHLORIDE 600; 310; 30; 20 MG/100ML; MG/100ML; MG/100ML; MG/100ML
INJECTION, SOLUTION INTRAVENOUS CONTINUOUS PRN
Status: DISCONTINUED | OUTPATIENT
Start: 2024-01-23 | End: 2024-01-23

## 2024-01-23 RX ORDER — LIDOCAINE HYDROCHLORIDE 10 MG/ML
INJECTION, SOLUTION EPIDURAL; INFILTRATION; INTRACAUDAL; PERINEURAL AS NEEDED
Status: DISCONTINUED | OUTPATIENT
Start: 2024-01-23 | End: 2024-01-23

## 2024-01-23 RX ORDER — ALLOPURINOL 100 MG/1
100 TABLET ORAL 2 TIMES DAILY
Status: DISCONTINUED | OUTPATIENT
Start: 2024-01-23 | End: 2024-01-24 | Stop reason: HOSPADM

## 2024-01-23 RX ORDER — PROPOFOL 10 MG/ML
INJECTION, EMULSION INTRAVENOUS AS NEEDED
Status: DISCONTINUED | OUTPATIENT
Start: 2024-01-23 | End: 2024-01-23

## 2024-01-23 RX ORDER — DIVALPROEX SODIUM 500 MG/1
500 TABLET, EXTENDED RELEASE ORAL
Status: DISCONTINUED | OUTPATIENT
Start: 2024-01-23 | End: 2024-01-24 | Stop reason: HOSPADM

## 2024-01-23 RX ORDER — FUROSEMIDE 40 MG/1
40 TABLET ORAL DAILY
Status: DISCONTINUED | OUTPATIENT
Start: 2024-01-24 | End: 2024-01-24 | Stop reason: HOSPADM

## 2024-01-23 RX ORDER — FOLIC ACID 1 MG/1
1000 TABLET ORAL DAILY
Status: DISCONTINUED | OUTPATIENT
Start: 2024-01-24 | End: 2024-01-24 | Stop reason: HOSPADM

## 2024-01-23 RX ADMIN — NYSTATIN: 100000 POWDER TOPICAL at 21:49

## 2024-01-23 RX ADMIN — SUGAMMADEX 400 MG: 100 INJECTION, SOLUTION INTRAVENOUS at 08:37

## 2024-01-23 RX ADMIN — SODIUM CHLORIDE 8 MCG: 900 INJECTION INTRAVENOUS at 08:26

## 2024-01-23 RX ADMIN — ACETAMINOPHEN 975 MG: 325 TABLET, FILM COATED ORAL at 21:39

## 2024-01-23 RX ADMIN — PROPOFOL 50 MCG/KG/MIN: 10 INJECTION, EMULSION INTRAVENOUS at 08:13

## 2024-01-23 RX ADMIN — Medication 10 MG: at 09:11

## 2024-01-23 RX ADMIN — ROCURONIUM BROMIDE 10 MG: 10 INJECTION, SOLUTION INTRAVENOUS at 08:47

## 2024-01-23 RX ADMIN — CARVEDILOL 3.12 MG: 3.12 TABLET, FILM COATED ORAL at 17:35

## 2024-01-23 RX ADMIN — SODIUM CHLORIDE, SODIUM LACTATE, POTASSIUM CHLORIDE, AND CALCIUM CHLORIDE: .6; .31; .03; .02 INJECTION, SOLUTION INTRAVENOUS at 07:23

## 2024-01-23 RX ADMIN — PHENYLEPHRINE HYDROCHLORIDE 40 MCG/MIN: 10 INJECTION INTRAVENOUS at 07:47

## 2024-01-23 RX ADMIN — OXYCODONE HYDROCHLORIDE 5 MG: 5 TABLET ORAL at 21:40

## 2024-01-23 RX ADMIN — SODIUM CHLORIDE: 0.9 INJECTION, SOLUTION INTRAVENOUS at 07:57

## 2024-01-23 RX ADMIN — Medication 200 MCG: at 07:47

## 2024-01-23 RX ADMIN — PROPOFOL 50 MG: 10 INJECTION, EMULSION INTRAVENOUS at 07:40

## 2024-01-23 RX ADMIN — SODIUM CHLORIDE, SODIUM LACTATE, POTASSIUM CHLORIDE, AND CALCIUM CHLORIDE 50 ML/HR: .6; .31; .03; .02 INJECTION, SOLUTION INTRAVENOUS at 12:29

## 2024-01-23 RX ADMIN — ONDANSETRON 4 MG: 2 INJECTION INTRAMUSCULAR; INTRAVENOUS at 09:30

## 2024-01-23 RX ADMIN — ALLOPURINOL 100 MG: 100 TABLET ORAL at 17:35

## 2024-01-23 RX ADMIN — LIDOCAINE HYDROCHLORIDE 50 MG: 10 INJECTION, SOLUTION EPIDURAL; INFILTRATION; INTRACAUDAL at 07:40

## 2024-01-23 RX ADMIN — INSULIN LISPRO 2 UNITS: 100 INJECTION, SOLUTION INTRAVENOUS; SUBCUTANEOUS at 17:35

## 2024-01-23 RX ADMIN — DIVALPROEX SODIUM 500 MG: 500 TABLET, EXTENDED RELEASE ORAL at 21:40

## 2024-01-23 RX ADMIN — CEFAZOLIN 2000 MG: 1 INJECTION, POWDER, FOR SOLUTION INTRAMUSCULAR; INTRAVENOUS at 08:02

## 2024-01-23 RX ADMIN — SODIUM CHLORIDE, SODIUM LACTATE, POTASSIUM CHLORIDE, AND CALCIUM CHLORIDE 50 ML/HR: .6; .31; .03; .02 INJECTION, SOLUTION INTRAVENOUS at 11:21

## 2024-01-23 RX ADMIN — GABAPENTIN 600 MG: 300 CAPSULE ORAL at 21:40

## 2024-01-23 RX ADMIN — ROCURONIUM BROMIDE 50 MG: 10 INJECTION, SOLUTION INTRAVENOUS at 07:40

## 2024-01-23 RX ADMIN — NYSTATIN: 100000 POWDER TOPICAL at 14:29

## 2024-01-23 RX ADMIN — METRONIDAZOLE: 500 INJECTION, SOLUTION INTRAVENOUS at 08:02

## 2024-01-23 RX ADMIN — FENTANYL CITRATE 50 MCG: 50 INJECTION INTRAMUSCULAR; INTRAVENOUS at 07:40

## 2024-01-23 RX ADMIN — ROCURONIUM BROMIDE 10 MG: 10 INJECTION, SOLUTION INTRAVENOUS at 09:08

## 2024-01-23 RX ADMIN — OXYCODONE HYDROCHLORIDE 5 MG: 5 TABLET ORAL at 14:28

## 2024-01-23 RX ADMIN — INSULIN LISPRO 1 UNITS: 100 INJECTION, SOLUTION INTRAVENOUS; SUBCUTANEOUS at 14:29

## 2024-01-23 RX ADMIN — HYDROMORPHONE HYDROCHLORIDE 0.5 MG: 1 INJECTION, SOLUTION INTRAMUSCULAR; INTRAVENOUS; SUBCUTANEOUS at 08:47

## 2024-01-23 RX ADMIN — ACETAMINOPHEN 975 MG: 325 TABLET, FILM COATED ORAL at 06:56

## 2024-01-23 RX ADMIN — QUETIAPINE FUMARATE 100 MG: 100 TABLET ORAL at 21:39

## 2024-01-23 RX ADMIN — SUGAMMADEX 700 MG: 100 INJECTION, SOLUTION INTRAVENOUS at 09:52

## 2024-01-23 RX ADMIN — DOCUSATE SODIUM 100 MG: 100 CAPSULE, LIQUID FILLED ORAL at 14:27

## 2024-01-23 RX ADMIN — ATORVASTATIN CALCIUM 20 MG: 20 TABLET, FILM COATED ORAL at 21:40

## 2024-01-23 RX ADMIN — SODIUM CHLORIDE 8 MCG: 900 INJECTION INTRAVENOUS at 07:26

## 2024-01-23 RX ADMIN — DEXAMETHASONE SODIUM PHOSPHATE 5 MG: 10 INJECTION, SOLUTION INTRAMUSCULAR; INTRAVENOUS at 07:40

## 2024-01-23 RX ADMIN — GABAPENTIN 600 MG: 300 CAPSULE ORAL at 14:28

## 2024-01-23 RX ADMIN — Medication 20 MG: at 07:40

## 2024-01-23 RX ADMIN — ACETAMINOPHEN 975 MG: 325 TABLET, FILM COATED ORAL at 14:27

## 2024-01-23 NOTE — PROGRESS NOTES
Post Op Check - Gyn/Onc  Deloris Snowden 68 y.o. female MRN: [id number]  Unit/Bed#: -Ana Encounter: [id number]      Subjective:  Deloris Snowden is doing well post-operatively from a RATLH, BSO, and cysto. She is voiding spontaneously, has passed gas, is tolerating PO fluids and meals without nausea/vomiting, and ambulating without difficulty. Pain is well controlled with medications. She denies shortness of breath, chest pain, calf pain, swelling, fevers/chills.    Vitals:   /62   Pulse 93   Temp 98.3 °F (36.8 °C)   Resp 17   Ht 5' (1.524 m)   Wt 88 kg (194 lb)   SpO2 95%   BMI 37.89 kg/m²     Physical Exam:   GEN: Deloris Snowden appears well, alert and oriented x 3, pleasant and cooperative   CV: Normal rate and rhythm. No murmurs  Pulm: Bilateral rhonchi present. Normal effort, on 4L NC (baseline)  ABDOMEN: soft, no tenderness, no distention. 5 incisions all C/D/I  EXTREMITIES: SCDs on    Plan:  - Discontinue IV fluids  - Continue SSI on carb-controlled diet  - Continue home meds  - Plan for discharge tomorrow    Era Womack MD  1/23/2024  5:53 PM

## 2024-01-23 NOTE — PERIOPERATIVE NURSING NOTE
Critical ABG results pH 7.248 and pCO2 62.8. Fredrick TAI notified. Patient weaned to 4L NC baseline. MD to assess patient at bedside.

## 2024-01-23 NOTE — ANESTHESIA POSTPROCEDURE EVALUATION
Post-Op Assessment Note    CV Status:  Stable  Pain Score: 0    Pain management: adequate       Mental Status:  Awake and sleepy   Hydration Status:  Euvolemic   PONV Controlled:  Controlled   Airway Patency:  Patent     Post Op Vitals Reviewed: Yes    No anethesia notable event occurred.    Staff: MARYCRUZ               /70 (01/23/24 1005)    Temp 97.7 °F (36.5 °C) (01/23/24 1005)    Pulse 86 (01/23/24 1005)   Resp 22 (01/23/24 1005)    SpO2 97% on 10 L mask

## 2024-01-23 NOTE — PLAN OF CARE
Problem: Prexisting or High Potential for Compromised Skin Integrity  Goal: Skin integrity is maintained or improved  Description: INTERVENTIONS:  - Identify patients at risk for skin breakdown  - Assess and monitor skin integrity  - Assess and monitor nutrition and hydration status  - Monitor labs   - Assess for incontinence   - Turn and reposition patient  - Assist with mobility/ambulation  - Relieve pressure over bony prominences  - Avoid friction and shearing  - Provide appropriate hygiene as needed including keeping skin clean and dry  - Evaluate need for skin moisturizer/barrier cream  - Collaborate with interdisciplinary team   - Patient/family teaching  - Consider wound care consult   Outcome: Progressing     Problem: PAIN - ADULT  Goal: Verbalizes/displays adequate comfort level or baseline comfort level  Description: Interventions:  - Encourage patient to monitor pain and request assistance  - Assess pain using appropriate pain scale  - Administer analgesics based on type and severity of pain and evaluate response  - Implement non-pharmacological measures as appropriate and evaluate response  - Consider cultural and social influences on pain and pain management  - Notify physician/advanced practitioner if interventions unsuccessful or patient reports new pain  Outcome: Progressing     Problem: INFECTION - ADULT  Goal: Absence or prevention of progression during hospitalization  Description: INTERVENTIONS:  - Assess and monitor for signs and symptoms of infection  - Monitor lab/diagnostic results  - Monitor all insertion sites, i.e. indwelling lines, tubes, and drains  - Monitor endotracheal if appropriate and nasal secretions for changes in amount and color  - [place] appropriate cooling/warming therapies per order  - Administer medications as ordered  - Instruct and encourage patient and family to use good hand hygiene technique  - Identify and instruct in appropriate isolation precautions for  identified infection/condition  Outcome: Progressing  Goal: Absence of fever/infection during neutropenic period  Description: INTERVENTIONS:  - Monitor WBC    Outcome: Progressing     Problem: SAFETY ADULT  Goal: Patient will remain free of falls  Description: INTERVENTIONS:  - Educate patient/family on patient safety including physical limitations  - Instruct patient to call for assistance with activity   - Consult OT/PT to assist with strengthening/mobility   - Keep Call bell within reach  - Keep bed low and locked with side rails adjusted as appropriate  - Keep care items and personal belongings within reach  - Initiate and maintain comfort rounds  - Make Fall Risk Sign visible to staff  - Offer Toileting every 2 Hours, in advance of need  - Initiate/Maintain bed/chair alarm  - Obtain necessary fall risk management equipment: nonskid footwear  - Apply yellow socks and bracelet for high fall risk patients  - Consider moving patient to room near nurses station  Outcome: Progressing  Goal: Maintain or return to baseline ADL function  Description: INTERVENTIONS:  -  Assess patient's ability to carry out ADLs; assess patient's baseline for ADL function and identify physical deficits which impact ability to perform ADLs (bathing, care of mouth/teeth, toileting, grooming, dressing, etc.)  - Assess/evaluate cause of self-care deficits   - Assess range of motion  - Assess patient's mobility; develop plan if impaired  - Assess patient's need for assistive devices and provide as appropriate  - Encourage maximum independence but intervene and supervise when necessary  - Involve family in performance of ADLs  - Assess for home care needs following discharge   - Consider OT consult to assist with ADL evaluation and planning for discharge  - Provide patient education as appropriate  Outcome: Progressing  Goal: Maintains/Returns to pre admission functional level  Description: INTERVENTIONS:  - Perform AM-PAC 6 Click Basic  Mobility/ Daily Activity assessment daily.  - Set and communicate daily mobility goal to care team and patient/family/caregiver.   - Collaborate with rehabilitation services on mobility goals if consulted  - Perform Range of Motion 3 times a day.  - Reposition patient every 2 hours.  - Dangle patient 3 times a day  - Stand patient 3 times a day  - Ambulate patient 3 times a day  - Out of bed to chair 3 times a day   - Out of bed for meals 3 times a day  - Out of bed for toileting  - Record patient progress and toleration of activity level   Outcome: Progressing     Problem: DISCHARGE PLANNING  Goal: Discharge to home or other facility with appropriate resources  Description: INTERVENTIONS:  - Identify barriers to discharge w/patient and caregiver  - Arrange for needed discharge resources and transportation as appropriate  - Identify discharge learning needs (meds, wound care, etc.)  - Arrange for interpretive services to assist at discharge as needed  - Refer to Case Management Department for coordinating discharge planning if the patient needs post-hospital services based on physician/advanced practitioner order or complex needs related to functional status, cognitive ability, or social support system  Outcome: Progressing     Problem: Knowledge Deficit  Goal: Patient/family/caregiver demonstrates understanding of disease process, treatment plan, medications, and discharge instructions  Description: Complete learning assessment and assess knowledge base.  Interventions:  - Provide teaching at level of understanding  - Provide teaching via preferred learning methods  Outcome: Progressing
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
## 2024-01-23 NOTE — PERIOPERATIVE NURSING NOTE
Fredrick Daniels assessed aptient at bedside. Patient currently on 4L NC, O2 sats 92-95%. Patient A&Ox4. No new orders by Md at this time.

## 2024-01-23 NOTE — ANESTHESIA PROCEDURE NOTES
"Arterial Line Insertion    Performed by: Abraham Souza CRNA  Authorized by: Estefania Alcala MD  Consent: Verbal consent obtained. Written consent not obtained.  Risks and benefits: risks, benefits and alternatives were discussed  Consent given by: patient  Patient understanding: patient states understanding of the procedure being performed  Patient consent: the patient's understanding of the procedure matches consent given  Procedure consent: procedure consent matches procedure scheduled  Relevant documents: relevant documents present and verified  Test results: test results not available  Site marked: the operative site was not marked  Radiology Images: No Radiology Images displayed or report reviewed.    Patient identity confirmed: hospital-assigned identification number and arm band  Time out: Immediately prior to procedure a \"time out\" was called to verify the correct patient, procedure, equipment, support staff and site/side marked as required.  Indications: hemodynamic monitoring  Orientation:  Left  Sedation:  Patient sedated: intubated.    Procedure Details:  Merrick's test normal: yes  Needle gauge: 20  Seldinger technique: Seldinger technique used  Number of attempts: 3    Post-procedure:  Post-procedure: dressing applied  Waveform: good waveform  Post-procedure CNS: normal  Patient tolerance: patient tolerated the procedure well with no immediate complications  Comments: Ultrasound used for insertion of arterial line          "

## 2024-01-23 NOTE — ANESTHESIA PREPROCEDURE EVALUATION
Procedure:  HYSTERECTOMY LAPAROSCOPIC TOTAL (LTH) W/ ROBOTICS, BILATERAL SALPINGO-OOPHORECTOMY, POSSIBLE LYMPH NODE DISSECTION (Abdomen)  LAPAROTOMY EXPLORATORY W/ ROBOTICS (Abdomen)    Relevant Problems   CARDIO   (+) Essential hypertension   (+) Hyperlipidemia      PULMONARY   (+) Other emphysema (HCC)        Physical Exam    Airway    Mallampati score: IV  TM Distance: <3 FB  Neck ROM: limited     Dental    upper dentures and lower dentures    Cardiovascular  Cardiovascular exam normal    Pulmonary  Pulmonary exam normal     Other Findings  post-pubertal.      Anesthesia Plan  ASA Score- 4     Anesthesia Type- general with ASA Monitors.         Additional Monitors: arterial line.    Airway Plan: ETT.    Comment: No issues with prev anesthetics   Medical clearances in chart from pulmonology/cardiology.       Severe COPD/PRANAV/asthma- requires 4l O2. Took all inhalers  Not active at home; LBBB on EKG; no chestpain or other cardiac complaints. ECHO 6/23: mild LVH, preserved EF; no valvular abnl  Admit postop; discussed possible prolonged intubation with sons.       Plan Factors-Exercise tolerance (METS): <4 METS.    Chart reviewed. EKG reviewed. Imaging results reviewed. Existing labs reviewed. Patient summary reviewed.    Patient is not a current smoker.      Obstructive sleep apnea risk education given perioperatively.        Induction- intravenous.    Postoperative Plan- Plan for postoperative opioid use. Planned trial extubation    Informed Consent- Anesthetic plan and risks discussed with patient and son.  I personally reviewed this patient with the CRNA. Discussed and agreed on the Anesthesia Plan with the CRNA..

## 2024-01-23 NOTE — OP NOTE
OPERATIVE REPORT  PATIENT NAME: Deloris Snowden    :  1955  MRN: [id number]  Pt Location: [facility] OR ROOM 14    SURGERY DATE: 2024    Surgeons and Role:     * Julito Leonard MD - Primary     * Alyson Begum PA-C - Assisting     * Era Womack MD - Assisting     * Harpal Otto MD - Assisting    Preop Diagnosis:  EIN (endometrial intraepithelial neoplasia) [N85.02]    Post-Op Diagnosis Codes:     * EIN (endometrial intraepithelial neoplasia) [N85.02]    Procedure(s):  HYSTERECTOMY LAPAROSCOPIC TOTAL (LTH) W/ ROBOTICS. BILATERAL SALPINGO-OOPHORECTOMY, CYSTOSCOPY    Specimen(s):  ID Type Source Tests Collected by Time Destination   1 : cervix Tissue Uterus w/Bilateral Ovaries and Fallopian Tubes TISSUE EXAM Julito Leonard MD 2024 [id number]        Estimated Blood Loss:   25 mL    Drains:  NG/OG/Enteral Tube Orogastric 18 Fr Center mouth (Active)   Number of days: 0       [REMOVED] Urethral Catheter Double-lumen;Non-latex 16 Fr. (Removed)   Number of days: 0       Anesthesia Type:   General    Operative Indications:  EIN (endometrial intraepithelial neoplasia) [N85.02]    68 y.o. with postmenopausal bleeding, atypical glandular proliferation on endometrial biopsy who presents for definitive surgical management.     Operative Findings:  - Exam under anesthesia with normal sized uterus, mildly stenotic cervix  - Laparoscopy with normal liver edge and upper abdomen. Edematous peritoneal tissue, mild scarring between uterus and bladder from prior  section, otherwise normal appearing uterus, bilateral fallopian tubes and ovaries. Adhesions of sigmoid to left cul-de-sac. No evidence of extrauterine disease.   - Cystoscopy with normal bladder mucosa, bilateral ureteral jets visualized     Complications:   None    Procedure and Technique:  After informed consent was obtained, the patient was taken the operating room where general endotracheal anesthesia was administered without  incident.  She was then prepped and draped in the normal sterile fashion in the low dorsolithotomy position.  Examination under anesthesia was then performed with findings noted as above.  A speculum was placed in the patient's vagina.  The anterior lip of the cervix was grasped with a single-tooth tenaculum.  The uterus was sounded to 6 cm.  The cervix was dilated.  A 0 Vicryl stay suture was placed. The Lakshmi uterine manipulator with Koh cup was then inserted and secured to the cervix using the 0 Vicryl stay suture.  A Calix catheter was placed.  Attention was then turned to the abdomen.  0.25% Marcaine was used infiltrate the skin prior to placement of any trocar.  The first insertion site was approximately 3 cm from the umbilicus in the midline.  An 8 mm skin incision was made using an 11 blade scalpel.  Through this was passed an 8 mm robotic trocar under direct visualization into the abdominal cavity.  Findings on laparoscopy are noted as above.  Additional trocars then placed under direct visualization.  There were 2 robotic trocars placed on left side of the abdomen, 1 on the right side of the abdomen and a 5 mm assistant port was placed in the right upper quadrant.  A total of 5 trocar sites were utilized.  The robot was docked.     Attention was first turned to the left side of the pelvis.  The retroperitoneum was opened by cauterizing and transecting the round ligament.  The posterior peritoneum was opened and the the ureter was identified coursing normally within the medial leaf of the broad ligament. The infundibulopelvic ligament on the left side was then cauterized and transected.  Attention was then turned to the right side of the pelvis.  The retroperitoneal space was opened by cauterizing and transecting the round ligament.   The posterior peritoneum was opened and the ureter was notified coursing normally within the medial leaf the broad ligament. The infundibulopelvic ligament on the right was  then skeletonized, cauterized and transected.  The vesicovaginal space was then developed using monopolar cautery.  The bladder was able to be taken down below the level of the external os of the cervix with cautery and sharp dissection.  The bilateral uterine vessels were then skeletonized, cauterized and transected.  The cardinal ligaments were then cauterized and transected.  A circumferential colpotomy was then performed using monopolar cautery and the uterus, cervix, bilateral tubes and ovaries were then removed transvaginally.       A 2-0 STRATAFIX suture was then advanced into the abdominal cavity transvaginally and used to close the vagina in a running fashion.  Hemostasis was found to be excellent.  The pelvis was irrigated.  The sigmoid mesentery was inspected. There was an area of superficial bleeding along the epiploica. This was cauterized, taking note of the colon which was well away from this area. The pressure in the pelvis was brought down to less than 5 mmHg without evidence of bleeding from any site.  The pressure was then increased.  The robot was undocked.      The Calix catheter was removed. The bladder was filled with 150cc saline. The laparoscope was used to perform a cystoscopy. The bladder mucosa was normal with no evidence of lesions or injury.  Bilateral ureteral orifices were identified and potent jets noted. At the completion of the procedure the bladder was drained.    The 2-0 STRATAFIX suture was then removed laparoscopically.  The gas was removed from abdominal cavity.  The ports were then removed under direct visualization.  The skin was closed at all sites using 4-0 Monocryl followed by Exofin. The vagina was inspected.  There is no evidence of bleeding identified.  The patient was then awakened and transferred to the recovery room in stable condition.  All instrument counts correct x2 for the procedure.  No complications.  Estimated blood loss is 25cc.    Dr. Leonard was present  for the entire procedure. and A physician assistant was required during the procedure for retraction, tissue handling, dissection and suturing.    Patient Disposition:  PACU       SIGNATURE: Harpal Otto MD  DATE: January 23, 2024  TIME: 9:53 AM

## 2024-01-24 ENCOUNTER — TELEPHONE (OUTPATIENT)
Dept: GYNECOLOGIC ONCOLOGY | Facility: CLINIC | Age: 69
End: 2024-01-24

## 2024-01-24 VITALS
WEIGHT: 194 LBS | HEIGHT: 60 IN | BODY MASS INDEX: 38.09 KG/M2 | HEART RATE: 80 BPM | TEMPERATURE: 98.5 F | OXYGEN SATURATION: 95 % | SYSTOLIC BLOOD PRESSURE: 125 MMHG | DIASTOLIC BLOOD PRESSURE: 57 MMHG | RESPIRATION RATE: 16 BRPM

## 2024-01-24 LAB
ANION GAP SERPL CALCULATED.3IONS-SCNC: 7 MMOL/L
BUN SERPL-MCNC: 14 MG/DL (ref 5–25)
CALCIUM SERPL-MCNC: 8.5 MG/DL (ref 8.4–10.2)
CHLORIDE SERPL-SCNC: 106 MMOL/L (ref 96–108)
CO2 SERPL-SCNC: 30 MMOL/L (ref 21–32)
CREAT SERPL-MCNC: 1.11 MG/DL (ref 0.6–1.3)
ERYTHROCYTE [DISTWIDTH] IN BLOOD BY AUTOMATED COUNT: 14.6 % (ref 11.6–15.1)
GFR SERPL CREATININE-BSD FRML MDRD: 50 ML/MIN/1.73SQ M
GLUCOSE P FAST SERPL-MCNC: 128 MG/DL (ref 65–99)
GLUCOSE SERPL-MCNC: 128 MG/DL (ref 65–140)
GLUCOSE SERPL-MCNC: 139 MG/DL (ref 65–140)
HCT VFR BLD AUTO: 28 % (ref 34.8–46.1)
HGB BLD-MCNC: 9.2 G/DL (ref 11.5–15.4)
MAGNESIUM SERPL-MCNC: 1.9 MG/DL (ref 1.9–2.7)
MCH RBC QN AUTO: 32.9 PG (ref 26.8–34.3)
MCHC RBC AUTO-ENTMCNC: 32.9 G/DL (ref 31.4–37.4)
MCV RBC AUTO: 100 FL (ref 82–98)
PLATELET # BLD AUTO: 118 THOUSANDS/UL (ref 149–390)
PMV BLD AUTO: 10.7 FL (ref 8.9–12.7)
POTASSIUM SERPL-SCNC: 4 MMOL/L (ref 3.5–5.3)
RBC # BLD AUTO: 2.8 MILLION/UL (ref 3.81–5.12)
SODIUM SERPL-SCNC: 143 MMOL/L (ref 135–147)
WBC # BLD AUTO: 5.45 THOUSAND/UL (ref 4.31–10.16)

## 2024-01-24 PROCEDURE — 99024 POSTOP FOLLOW-UP VISIT: CPT | Performed by: OBSTETRICS & GYNECOLOGY

## 2024-01-24 PROCEDURE — 82948 REAGENT STRIP/BLOOD GLUCOSE: CPT

## 2024-01-24 PROCEDURE — 85027 COMPLETE CBC AUTOMATED: CPT | Performed by: STUDENT IN AN ORGANIZED HEALTH CARE EDUCATION/TRAINING PROGRAM

## 2024-01-24 PROCEDURE — 80048 BASIC METABOLIC PNL TOTAL CA: CPT | Performed by: STUDENT IN AN ORGANIZED HEALTH CARE EDUCATION/TRAINING PROGRAM

## 2024-01-24 PROCEDURE — 83735 ASSAY OF MAGNESIUM: CPT | Performed by: STUDENT IN AN ORGANIZED HEALTH CARE EDUCATION/TRAINING PROGRAM

## 2024-01-24 RX ORDER — OXYCODONE HYDROCHLORIDE 5 MG/1
5 TABLET ORAL EVERY 6 HOURS PRN
Qty: 10 TABLET | Refills: 0 | Status: SHIPPED | OUTPATIENT
Start: 2024-01-24 | End: 2024-02-03

## 2024-01-24 RX ADMIN — DOCUSATE SODIUM 100 MG: 100 CAPSULE, LIQUID FILLED ORAL at 08:03

## 2024-01-24 RX ADMIN — NYSTATIN: 100000 POWDER TOPICAL at 08:06

## 2024-01-24 RX ADMIN — FUROSEMIDE 40 MG: 40 TABLET ORAL at 08:03

## 2024-01-24 RX ADMIN — FOLIC ACID 1000 MCG: 1 TABLET ORAL at 08:03

## 2024-01-24 RX ADMIN — OXYCODONE HYDROCHLORIDE 5 MG: 5 TABLET ORAL at 09:20

## 2024-01-24 RX ADMIN — CARVEDILOL 3.12 MG: 3.12 TABLET, FILM COATED ORAL at 08:03

## 2024-01-24 RX ADMIN — ALLOPURINOL 100 MG: 100 TABLET ORAL at 08:03

## 2024-01-24 RX ADMIN — ESCITALOPRAM OXALATE 20 MG: 20 TABLET ORAL at 08:03

## 2024-01-24 RX ADMIN — GABAPENTIN 600 MG: 300 CAPSULE ORAL at 08:03

## 2024-01-24 RX ADMIN — OXYCODONE HYDROCHLORIDE 5 MG: 5 TABLET ORAL at 05:12

## 2024-01-24 RX ADMIN — PANTOPRAZOLE SODIUM 20 MG: 20 TABLET, DELAYED RELEASE ORAL at 05:08

## 2024-01-24 RX ADMIN — CITALOPRAM HYDROBROMIDE 10 MG: 10 TABLET ORAL at 08:03

## 2024-01-24 RX ADMIN — ACETAMINOPHEN 975 MG: 325 TABLET, FILM COATED ORAL at 05:08

## 2024-01-24 RX ADMIN — AMLODIPINE BESYLATE 2.5 MG: 2.5 TABLET ORAL at 08:03

## 2024-01-24 NOTE — PROGRESS NOTES
For questions/concerns on this patient, please reach out to the following:  [facility] GYN-GynOnc- Resident/AP  Gyn Oncology Progress note   Deloris Snowden 68 y.o. female MRN: [id number]  Unit/Bed#: -Ana Encounter: [id number]    Assessment/Plan:    68 y.o. POD# 1 from RATLH, BSO, and cysto. She is recovering well and is stable for discharge home today.    * EIN (endometrial intraepithelial neoplasia)  Assessment & Plan  67yo with PMB and endometrial biopsy demonstrating atypical glandular proliferation now POD 1 from robotic assisted total laparoscopic hysterectomy, bilateral salpingo-oophorectomy, cysto    - FEN: Carb-controlled diet, zofran PRN, colace BID  - Pain: Tylenol scheduled, oxy 2.5/5mg PRN, Dilaudid PRN for breakthrough  - CV: monitor fluid status  - Heme: f/u AM labs  - : s/p lane in operating room  - Lines: peripheral IV    Essential hypertension  Assessment & Plan  Home amlodipine, carvedilol, lasix ordered    Hyperlipidemia  Assessment & Plan  Home atorvastatin ordered    Diabetes mellitus (HCC)  Assessment & Plan    Lab Results   Component Value Date    HGBA1C 6.6 (H) 11/16/2023     Home metformin held  On SSI    Bipolar 1 disorder (HCC)  Assessment & Plan  Home Celexa, Lexapro, Depakote, and seroquel ordered    Other emphysema (HCC)  Assessment & Plan  On 4L O2 requirement at home  Bipap at night   Continue home albuterol PRN   Incentive spirometry q1h while awake         Subjective:    Deloris Snowden complains of increased pain overnight but states that oxycodone helps. Overnight events: none.   Patient is currently voiding.  She is ambulating.  Patient is currently passing flatus and has had no bowel movement. She is tolerating PO, and denies nausea or vomiting. Patient denies fever, chills, chest pain, shortness of breath, or calf tenderness.     Objective:  /53   Pulse 80   Temp 97.8 °F (36.6 °C)   Resp 14   Ht 5' (1.524 m)   Wt 88 kg (194 lb)   SpO2 96%   BMI 37.89 kg/m²     I/O  last 3 completed shifts:  In: 600 [I.V.:600]  Out: 635 [Urine:610; Blood:25]  I/O this shift:  In: -   Out: 100 [Urine:100]    Lab Results   Component Value Date    WBC 5.50 11/16/2023    HGB 10.3 (L) 11/16/2023    HCT 32.7 (L) 11/16/2023     (H) 11/16/2023     (L) 11/16/2023       Lab Results   Component Value Date    CALCIUM 9.5 11/16/2023    K 4.5 11/16/2023    CO2 31 11/16/2023     11/16/2023    BUN 15 11/16/2023    CREATININE 1.19 11/16/2023           Physical Exam  Constitutional:       General: She is not in acute distress.  HENT:      Head: Normocephalic and atraumatic.   Eyes:      Extraocular Movements: Extraocular movements intact.   Cardiovascular:      Rate and Rhythm: Normal rate and regular rhythm.      Heart sounds: Normal heart sounds.   Pulmonary:      Effort: Pulmonary effort is normal.      Breath sounds: Rhonchi present.   Abdominal:      General: There is no distension.      Palpations: Abdomen is soft.      Tenderness: There is no abdominal tenderness.   Musculoskeletal:      Right lower leg: No edema.      Left lower leg: No edema.   Skin:     General: Skin is warm and dry.   Neurological:      General: No focal deficit present.      Mental Status: She is alert and oriented to person, place, and time.   Psychiatric:         Mood and Affect: Mood normal.           Era Womack MD  1/24/2024  6:02 AM

## 2024-01-24 NOTE — PLAN OF CARE
Problem: Prexisting or High Potential for Compromised Skin Integrity  Goal: Skin integrity is maintained or improved  Description: INTERVENTIONS:  - Identify patients at risk for skin breakdown  - Assess and monitor skin integrity  - Assess and monitor nutrition and hydration status  - Monitor labs   - Assess for incontinence   - Turn and reposition patient  - Assist with mobility/ambulation  - Relieve pressure over bony prominences  - Avoid friction and shearing  - Provide appropriate hygiene as needed including keeping skin clean and dry  - Evaluate need for skin moisturizer/barrier cream  - Collaborate with interdisciplinary team   - Patient/family teaching  - Consider wound care consult   Outcome: Progressing     Problem: PAIN - ADULT  Goal: Verbalizes/displays adequate comfort level or baseline comfort level  Description: Interventions:  - Encourage patient to monitor pain and request assistance  - Assess pain using appropriate pain scale  - Administer analgesics based on type and severity of pain and evaluate response  - Implement non-pharmacological measures as appropriate and evaluate response  - Consider cultural and social influences on pain and pain management  - Notify physician/advanced practitioner if interventions unsuccessful or patient reports new pain  Outcome: Progressing     Problem: INFECTION - ADULT  Goal: Absence or prevention of progression during hospitalization  Description: INTERVENTIONS:  - Assess and monitor for signs and symptoms of infection  - Monitor lab/diagnostic results  - Monitor all insertion sites, i.e. indwelling lines, tubes, and drains  - Monitor endotracheal if appropriate and nasal secretions for changes in amount and color  - [place] appropriate cooling/warming therapies per order  - Administer medications as ordered  - Instruct and encourage patient and family to use good hand hygiene technique  - Identify and instruct in appropriate isolation precautions for  identified infection/condition  Outcome: Progressing  Goal: Absence of fever/infection during neutropenic period  Description: INTERVENTIONS:  - Monitor WBC    Outcome: Progressing     Problem: SAFETY ADULT  Goal: Patient will remain free of falls  Description: INTERVENTIONS:  - Educate patient/family on patient safety including physical limitations  - Instruct patient to call for assistance with activity   - Consult OT/PT to assist with strengthening/mobility   - Keep Call bell within reach  - Keep bed low and locked with side rails adjusted as appropriate  - Keep care items and personal belongings within reach  - Initiate and maintain comfort rounds  - Make Fall Risk Sign visible to staff  - Apply yellow socks and bracelet for high fall risk patients  - Consider moving patient to room near nurses station  Outcome: Progressing  Goal: Maintain or return to baseline ADL function  Description: INTERVENTIONS:  -  Assess patient's ability to carry out ADLs; assess patient's baseline for ADL function and identify physical deficits which impact ability to perform ADLs (bathing, care of mouth/teeth, toileting, grooming, dressing, etc.)  - Assess/evaluate cause of self-care deficits   - Assess range of motion  - Assess patient's mobility; develop plan if impaired  - Assess patient's need for assistive devices and provide as appropriate  - Encourage maximum independence but intervene and supervise when necessary  - Involve family in performance of ADLs  - Assess for home care needs following discharge   - Consider OT consult to assist with ADL evaluation and planning for discharge  - Provide patient education as appropriate  Outcome: Progressing  Goal: Maintains/Returns to pre admission functional level  Description: INTERVENTIONS:  - Perform AM-PAC 6 Click Basic Mobility/ Daily Activity assessment daily.  - Set and communicate daily mobility goal to care team and patient/family/caregiver.   - Collaborate with rehabilitation  services on mobility goals if consulted  - Out of bed for toileting  - Record patient progress and toleration of activity level   Outcome: Progressing     Problem: DISCHARGE PLANNING  Goal: Discharge to home or other facility with appropriate resources  Description: INTERVENTIONS:  - Identify barriers to discharge w/patient and caregiver  - Arrange for needed discharge resources and transportation as appropriate  - Identify discharge learning needs (meds, wound care, etc.)  - Arrange for interpretive services to assist at discharge as needed  - Refer to Case Management Department for coordinating discharge planning if the patient needs post-hospital services based on physician/advanced practitioner order or complex needs related to functional status, cognitive ability, or social support system  Outcome: Progressing     Problem: Knowledge Deficit  Goal: Patient/family/caregiver demonstrates understanding of disease process, treatment plan, medications, and discharge instructions  Description: Complete learning assessment and assess knowledge base.  Interventions:  - Provide teaching at level of understanding  - Provide teaching via preferred learning methods  Outcome: Progressing

## 2024-01-24 NOTE — PLAN OF CARE
Problem: Prexisting or High Potential for Compromised Skin Integrity  Goal: Skin integrity is maintained or improved  Description: INTERVENTIONS:  - Identify patients at risk for skin breakdown  - Assess and monitor skin integrity  - Assess and monitor nutrition and hydration status  - Monitor labs   - Assess for incontinence   - Turn and reposition patient  - Assist with mobility/ambulation  - Relieve pressure over bony prominences  - Avoid friction and shearing  - Provide appropriate hygiene as needed including keeping skin clean and dry  - Evaluate need for skin moisturizer/barrier cream  - Collaborate with interdisciplinary team   - Patient/family teaching  - Consider wound care consult   1/24/2024 0942 by Haleigh Burr RN  Outcome: Adequate for Discharge  1/24/2024 0941 by Haleigh Burr RN  Outcome: Progressing     Problem: PAIN - ADULT  Goal: Verbalizes/displays adequate comfort level or baseline comfort level  Description: Interventions:  - Encourage patient to monitor pain and request assistance  - Assess pain using appropriate pain scale  - Administer analgesics based on type and severity of pain and evaluate response  - Implement non-pharmacological measures as appropriate and evaluate response  - Consider cultural and social influences on pain and pain management  - Notify physician/advanced practitioner if interventions unsuccessful or patient reports new pain  1/24/2024 0942 by Haleigh Burr RN  Outcome: Adequate for Discharge  1/24/2024 0941 by Haleigh Burr RN  Outcome: Progressing     Problem: INFECTION - ADULT  Goal: Absence or prevention of progression during hospitalization  Description: INTERVENTIONS:  - Assess and monitor for signs and symptoms of infection  - Monitor lab/diagnostic results  - Monitor all insertion sites, i.e. indwelling lines, tubes, and drains  - Monitor endotracheal if appropriate and nasal secretions for changes in amount and color  - [place] appropriate cooling/warming  therapies per order  - Administer medications as ordered  - Instruct and encourage patient and family to use good hand hygiene technique  - Identify and instruct in appropriate isolation precautions for identified infection/condition  1/24/2024 0942 by Haleigh Burr RN  Outcome: Adequate for Discharge  1/24/2024 0941 by Haleigh Burr RN  Outcome: Progressing  Goal: Absence of fever/infection during neutropenic period  Description: INTERVENTIONS:  - Monitor WBC    1/24/2024 0942 by Haleigh Burr RN  Outcome: Adequate for Discharge  1/24/2024 0941 by Haleigh Burr RN  Outcome: Progressing     Problem: SAFETY ADULT  Goal: Patient will remain free of falls  Description: INTERVENTIONS:  - Educate patient/family on patient safety including physical limitations  - Instruct patient to call for assistance with activity   - Consult OT/PT to assist with strengthening/mobility   - Keep Call bell within reach  - Keep bed low and locked with side rails adjusted as appropriate  - Keep care items and personal belongings within reach  - Initiate and maintain comfort rounds  - Make Fall Risk Sign visible to staff  - Offer Toileting every 2 Hours, in advance of need  - Initiate/Maintain alarm  - Obtain necessary fall risk management equipment:   - Apply yellow socks and bracelet for high fall risk patients  - Consider moving patient to room near nurses station  1/24/2024 0942 by Haleigh Burr RN  Outcome: Adequate for Discharge  1/24/2024 0941 by Haleigh Burr RN  Outcome: Progressing  Goal: Maintain or return to baseline ADL function  Description: INTERVENTIONS:  -  Assess patient's ability to carry out ADLs; assess patient's baseline for ADL function and identify physical deficits which impact ability to perform ADLs (bathing, care of mouth/teeth, toileting, grooming, dressing, etc.)  - Assess/evaluate cause of self-care deficits   - Assess range of motion  - Assess patient's mobility; develop plan if impaired  - Assess patient's need for  assistive devices and provide as appropriate  - Encourage maximum independence but intervene and supervise when necessary  - Involve family in performance of ADLs  - Assess for home care needs following discharge   - Consider OT consult to assist with ADL evaluation and planning for discharge  - Provide patient education as appropriate  1/24/2024 0942 by Haleigh Burr RN  Outcome: Adequate for Discharge  1/24/2024 0941 by Haleigh Burr RN  Outcome: Progressing  Goal: Maintains/Returns to pre admission functional level  Description: INTERVENTIONS:  - Perform AM-PAC 6 Click Basic Mobility/ Daily Activity assessment daily.  - Set and communicate daily mobility goal to care team and patient/family/caregiver.   - Collaborate with rehabilitation services on mobility goals if consulted  - Perform Range of Motion 3 times a day.  - Reposition patient every 2 hours.  - Dangle patient 3 times a day  - Stand patient 3 times a day  - Ambulate patient 3 times a day  - Out of bed to chair 3 times a day   - Out of bed for meals 3 times a day  - Out of bed for toileting  - Record patient progress and toleration of activity level   1/24/2024 0942 by Haleigh Burr RN  Outcome: Adequate for Discharge  1/24/2024 0941 by Haleigh Burr RN  Outcome: Progressing     Problem: DISCHARGE PLANNING  Goal: Discharge to home or other facility with appropriate resources  Description: INTERVENTIONS:  - Identify barriers to discharge w/patient and caregiver  - Arrange for needed discharge resources and transportation as appropriate  - Identify discharge learning needs (meds, wound care, etc.)  - Arrange for interpretive services to assist at discharge as needed  - Refer to Case Management Department for coordinating discharge planning if the patient needs post-hospital services based on physician/advanced practitioner order or complex needs related to functional status, cognitive ability, or social support system  1/24/2024 0942 by Haleigh Burr RN  Outcome:  Adequate for Discharge  1/24/2024 0941 by Haleigh Burr RN  Outcome: Progressing     Problem: Knowledge Deficit  Goal: Patient/family/caregiver demonstrates understanding of disease process, treatment plan, medications, and discharge instructions  Description: Complete learning assessment and assess knowledge base.  Interventions:  - Provide teaching at level of understanding  - Provide teaching via preferred learning methods  1/24/2024 0942 by Haleigh Burr RN  Outcome: Adequate for Discharge  1/24/2024 0941 by Haleigh Burr RN  Outcome: Progressing

## 2024-01-26 PROCEDURE — 88341 IMHCHEM/IMCYTCHM EA ADD ANTB: CPT | Performed by: PATHOLOGY

## 2024-01-26 PROCEDURE — 88342 IMHCHEM/IMCYTCHM 1ST ANTB: CPT | Performed by: PATHOLOGY

## 2024-01-26 PROCEDURE — 88309 TISSUE EXAM BY PATHOLOGIST: CPT | Performed by: PATHOLOGY

## 2024-02-01 ENCOUNTER — OFFICE VISIT (OUTPATIENT)
Age: 69
End: 2024-02-01
Payer: COMMERCIAL

## 2024-02-01 VITALS
TEMPERATURE: 97.5 F | HEIGHT: 60 IN | WEIGHT: 196.4 LBS | SYSTOLIC BLOOD PRESSURE: 120 MMHG | DIASTOLIC BLOOD PRESSURE: 76 MMHG | RESPIRATION RATE: 16 BRPM | OXYGEN SATURATION: 99 % | HEART RATE: 77 BPM | BODY MASS INDEX: 38.56 KG/M2

## 2024-02-01 DIAGNOSIS — C54.1 ENDOMETRIAL CANCER (HCC): Primary | ICD-10-CM

## 2024-02-01 PROCEDURE — 99214 OFFICE O/P EST MOD 30 MIN: CPT | Performed by: OBSTETRICS & GYNECOLOGY

## 2024-02-01 NOTE — ASSESSMENT & PLAN NOTE
68-year-old status post robotic assisted total laparoscopic hysterectomy, bilateral salpingo-oophorectomy, cystoscopy 1/23/2024 for a preoperative diagnosis of atypical glandular proliferation of the endometrium.  Final pathology consistent with a stage Ib grade 1 endometrial cancer, no LVSI, p53 wild-type, pMMR.  She is on chronic oxygen therapy.  Her performance status is 3.  1.  I reviewed the pathology report in detail.  We discussed that the risk of vaginal/local pelvic recurrence approaches 20% without adjuvant treatment.  Based on her pathology, stage, vaginal brachytherapy can reduce the risk of recurrence to approximately 4%.  I reviewed some of the risks of vaginal radiation therapy including vaginal stenosis.  She understands that adjuvant radiation therapy does not improve overall survival.  She is interested in discussing vaginal radiation with radiation oncology after her next appointment.  2. We further discussed the use of ctDNA and that it can be measured to assess the absence or presence of molecular residual disease. The sensitivity of this assay in gyn cancers has not been readily established but is utilized in other disease sites to monitor progression and treatment response. As a result, it may or may not give us clinical information that is useful at this time but may provide added insight for the future.  She is interested in CT DNA testing.  3.  CT of chest abdomen pelvis prior to her next visit to evaluate for occult metastatic disease.  4.  We reviewed ongoing active limitations  5.  Return in 4 weeks for postoperative pelvic examination.  I have spent a total time of 30 minutes on 02/01/24 in caring for this patient including Diagnostic results, Prognosis, Risks and benefits of tx options, Instructions for management, Impressions, Counseling / Coordination of care, Documenting in the medical record, Reviewing / ordering tests, medicine, procedures  , and Obtaining or reviewing history  .   Only brief time was spent on the postoperative history and physical examination.

## 2024-02-01 NOTE — PROGRESS NOTES
Assessment/Plan:    Problem List Items Addressed This Visit          Genitourinary    Endometrial cancer (HCC) - Primary     68-year-old status post robotic assisted total laparoscopic hysterectomy, bilateral salpingo-oophorectomy, cystoscopy 1/23/2024 for a preoperative diagnosis of atypical glandular proliferation of the endometrium.  Final pathology consistent with a stage Ib grade 1 endometrial cancer, no LVSI, p53 wild-type, pMMR.  She is on chronic oxygen therapy.  Her performance status is 3.  1.  I reviewed the pathology report in detail.  We discussed that the risk of vaginal/local pelvic recurrence approaches 20% without adjuvant treatment.  Based on her pathology, stage, vaginal brachytherapy can reduce the risk of recurrence to approximately 4%.  I reviewed some of the risks of vaginal radiation therapy including vaginal stenosis.  She understands that adjuvant radiation therapy does not improve overall survival.  She is interested in discussing vaginal radiation with radiation oncology after her next appointment.  2. We further discussed the use of ctDNA and that it can be measured to assess the absence or presence of molecular residual disease. The sensitivity of this assay in gyn cancers has not been readily established but is utilized in other disease sites to monitor progression and treatment response. As a result, it may or may not give us clinical information that is useful at this time but may provide added insight for the future.  She is interested in CT DNA testing.  3.  CT of chest abdomen pelvis prior to her next visit to evaluate for occult metastatic disease.  4.  We reviewed ongoing active limitations  5.  Return in 4 weeks for postoperative pelvic examination.  I have spent a total time of 30 minutes on 02/01/24 in caring for this patient including Diagnostic results, Prognosis, Risks and benefits of tx options, Instructions for management, Impressions, Counseling / Coordination of care,  Documenting in the medical record, Reviewing / ordering tests, medicine, procedures  , and Obtaining or reviewing history  .  Only brief time was spent on the postoperative history and physical examination.             Relevant Orders    CT chest abdomen pelvis w contrast         CHIEF COMPLAINT: Postoperative evaluation and treatment discussion       Problem:  Cancer Staging   Endometrial cancer (HCC)  Staging form: Corpus Uteri - Carcinoma, AJCC 8th Edition  - Pathologic stage from 1/23/2024: FIGO Stage IB, calculated as Stage Unknown (pT1b, pNX, cM0) - Signed by Julito Leonard MD on 2/1/2024        Previous therapy:  Oncology History   Endometrial cancer (HCC)   11/8/2023 Initial Diagnosis    Endometrial cancer ([facility])     1/23/2024 -  Cancer Staged    Staging form: Corpus Uteri - Carcinoma, AJCC 8th Edition  - Pathologic stage from 1/23/2024: FIGO Stage IB, calculated as Stage Unknown (pT1b, pNX, cM0) - Signed by Julito Leonard MD on 2/1/2024  Histologic grade (G): G1  Histologic grading system: 3 grade system  Lymph-vascular invasion (LVI): LVI not present (absent)/not identified       1/23/2024 Surgery    HYSTERECTOMY LAPAROSCOPIC TOTAL (LTH) W/ ROBOTICS. BILATERAL SALPINGO-OOPHORECTOMY, CYSTOSCOPY   Grade 1, 13 out of 15 mm depth of invasion, no LVSI, p53 wild-type. [facility]           Patient ID: Deloris Snowden is a 68 y.o. female  Who returns for postoperative evaluation.  She is tolerating regular diet, ambulating some, voiding, having bowel movements.  She is afebrile.  She had vaginal spotting briefly after surgery which has resolved.  No other interval change in medications or medical history since her surgery.        The following portions of the patient's history were reviewed and updated as appropriate: allergies, current medications, past family history, past medical history, past social history, past surgical history, and problem list.    Review of Systems      Current Outpatient  Medications:     albuterol (PROVENTIL HFA,VENTOLIN HFA) 90 mcg/act inhaler, as needed ., Disp: , Rfl:     allopurinol (ZYLOPRIM) 100 mg tablet, Take 100 mg by mouth 2 (two) times a day, Disp: , Rfl:     amLODIPine (NORVASC) 2.5 mg tablet, Take 2.5 mg by mouth daily, Disp: , Rfl:     atorvastatin (LIPITOR) 20 mg tablet, Take 20 mg by mouth daily at bedtime, Disp: , Rfl:     carvedilol (COREG) 3.125 mg tablet, Take 3.125 mg by mouth 2 (two) times a day, Disp: , Rfl:     Cholecalciferol 25 MCG (1000 UT) tablet, Take 1 tablet by mouth daily, Disp: , Rfl:     citalopram (CeleXA) 10 mg tablet, Take 10 mg by mouth daily, Disp: , Rfl:     divalproex sodium (DEPAKOTE ER) 500 mg 24 hr tablet, Take 500 mg by mouth daily at bedtime 2 tabs at bedtime, Disp: , Rfl:     escitalopram (LEXAPRO) 20 mg tablet, Take 20 mg by mouth daily, Disp: , Rfl:     folic acid (FOLVITE) 1 mg tablet, Take 1,000 mcg by mouth daily, Disp: , Rfl:     furosemide (LASIX) 40 mg tablet, Take 40 mg by mouth daily, Disp: , Rfl:     gabapentin (NEURONTIN) 600 MG tablet, Take 600 mg by mouth 4 (four) times a day, Disp: , Rfl:     K Phos Moore-Sod Phos Di & Mono (Phospha 250 Neutral) 155-852-130 MG TABS, Take 1 tablet by mouth 2 (two) times a day, Disp: , Rfl:     metFORMIN (GLUCOPHAGE) 500 mg tablet, Take 500 mg by mouth 3 (three) times a day with meals, Disp: , Rfl:     Mounjaro 2.5 MG/0.5ML, Inject 2.5 mg Subcutaneous weekly, Disp: , Rfl:     Mounjaro 5 MG/0.5ML, every 7 days On Fridays, Disp: , Rfl:     nystatin powder, apply as directed External Once a Day as Needed for 30 days, Disp: , Rfl:     omeprazole (PriLOSEC) 20 mg delayed release capsule, Take 20 mg by mouth daily, Disp: , Rfl:     oxyCODONE (Roxicodone) 5 immediate release tablet, Take 1 tablet (5 mg total) by mouth every 6 (six) hours as needed for severe pain for up to 10 days Max Daily Amount: 20 mg, Disp: 10 tablet, Rfl: 0    oxygen gas, Inhale 4 L/min continuous Uses 4 L, Disp: , Rfl:      Potassium Chloride ER 20 MEQ TBCR, Take 1 tablet by mouth 2 (two) times a day with meals, Disp: , Rfl:     QUEtiapine (SEROquel) 100 mg tablet, Take 100 mg by mouth daily at bedtime, Disp: , Rfl:     Objective:    Blood pressure 120/76, pulse 77, temperature 97.5 °F (36.4 °C), temperature source Temporal, resp. rate 16, height 5' (1.524 m), weight 89.1 kg (196 lb 6.4 oz), SpO2 99%, not currently breastfeeding.  Body mass index is 38.36 kg/m².  Body surface area is 1.85 meters squared.    Physical Exam  Vitals reviewed.   Constitutional:       General: She is not in acute distress.     Appearance: Normal appearance. She is obese.   HENT:      Head: Normocephalic and atraumatic.      Mouth/Throat:      Mouth: Mucous membranes are moist.   Pulmonary:      Effort: Pulmonary effort is normal.      Breath sounds: Normal breath sounds.   Abdominal:      Palpations: Abdomen is soft. There is no mass.      Tenderness: There is no abdominal tenderness.   Skin:     General: Skin is warm and dry.      Comments: Surgical trocar sites are intact, clean and dry without induration, erythema or purulent drainage.    Neurological:      Mental Status: She is alert and oriented to person, place, and time.   Psychiatric:         Mood and Affect: Mood normal.         Behavior: Behavior normal.         Thought Content: Thought content normal.         Judgment: Judgment normal.

## 2024-02-01 NOTE — LETTER
February 1, 2024     Dolly Arguello MD  [address]  [address]  [place] [address]    Patient: Deloris Snowden   YOB: 1955   Date of Visit: 2/1/2024       Dear Dr. Arguello:    Thank you for referring Deloris Snowden to me for evaluation. Below are my notes for this consultation.    If you have questions, please do not hesitate to call me. I look forward to following your patient along with you.         Sincerely,        Julito Leonard MD        CC: KATHRYN Khan MD  2/1/2024  2:42 PM  Sign when Signing Visit  Assessment/Plan:    Problem List Items Addressed This Visit          Genitourinary    Endometrial cancer (HCC) - Primary     68-year-old status post robotic assisted total laparoscopic hysterectomy, bilateral salpingo-oophorectomy, cystoscopy 1/23/2024 for a preoperative diagnosis of atypical glandular proliferation of the endometrium.  Final pathology consistent with a stage Ib grade 1 endometrial cancer, no LVSI, p53 wild-type, pMMR.  She is on chronic oxygen therapy.  Her performance status is 3.  1.  I reviewed the pathology report in detail.  We discussed that the risk of vaginal/local pelvic recurrence approaches 20% without adjuvant treatment.  Based on her pathology, stage, vaginal brachytherapy can reduce the risk of recurrence to approximately 4%.  I reviewed some of the risks of vaginal radiation therapy including vaginal stenosis.  She understands that adjuvant radiation therapy does not improve overall survival.  She is interested in discussing vaginal radiation with radiation oncology after her next appointment.  2. We further discussed the use of ctDNA and that it can be measured to assess the absence or presence of molecular residual disease. The sensitivity of this assay in gyn cancers has not been readily established but is utilized in other disease sites to monitor progression and treatment response. As a  result, it may or may not give us clinical information that is useful at this time but may provide added insight for the future.  She is interested in CT DNA testing.  3.  CT of chest abdomen pelvis prior to her next visit to evaluate for occult metastatic disease.  4.  We reviewed ongoing active limitations  5.  Return in 4 weeks for postoperative pelvic examination.  I have spent a total time of 30 minutes on 02/01/24 in caring for this patient including Diagnostic results, Prognosis, Risks and benefits of tx options, Instructions for management, Impressions, Counseling / Coordination of care, Documenting in the medical record, Reviewing / ordering tests, medicine, procedures  , and Obtaining or reviewing history  .  Only brief time was spent on the postoperative history and physical examination.             Relevant Orders    CT chest abdomen pelvis w contrast         CHIEF COMPLAINT: Postoperative evaluation and treatment discussion       Problem:  Cancer Staging   Endometrial cancer (HCC)  Staging form: Corpus Uteri - Carcinoma, AJCC 8th Edition  - Pathologic stage from 1/23/2024: FIGO Stage IB, calculated as Stage Unknown (pT1b, pNX, cM0) - Signed by Julito Leonard MD on 2/1/2024        Previous therapy:  Oncology History   Endometrial cancer (HCC)   11/8/2023 Initial Diagnosis    Endometrial cancer ([facility])     1/23/2024 -  Cancer Staged    Staging form: Corpus Uteri - Carcinoma, AJCC 8th Edition  - Pathologic stage from 1/23/2024: FIGO Stage IB, calculated as Stage Unknown (pT1b, pNX, cM0) - Signed by Julito Leonard MD on 2/1/2024  Histologic grade (G): G1  Histologic grading system: 3 grade system  Lymph-vascular invasion (LVI): LVI not present (absent)/not identified       1/23/2024 Surgery    HYSTERECTOMY LAPAROSCOPIC TOTAL (LTH) W/ ROBOTICS. BILATERAL SALPINGO-OOPHORECTOMY, CYSTOSCOPY   Grade 1, 13 out of 15 mm depth of invasion, no LVSI, p53 wild-type. [facility]           Patient ID: Deloris  Isi is a 68 y.o. female  Who returns for postoperative evaluation.  She is tolerating regular diet, ambulating some, voiding, having bowel movements.  She is afebrile.  She had vaginal spotting briefly after surgery which has resolved.  No other interval change in medications or medical history since her surgery.        The following portions of the patient's history were reviewed and updated as appropriate: allergies, current medications, past family history, past medical history, past social history, past surgical history, and problem list.    Review of Systems      Current Outpatient Medications:   •  albuterol (PROVENTIL HFA,VENTOLIN HFA) 90 mcg/act inhaler, as needed ., Disp: , Rfl:   •  allopurinol (ZYLOPRIM) 100 mg tablet, Take 100 mg by mouth 2 (two) times a day, Disp: , Rfl:   •  amLODIPine (NORVASC) 2.5 mg tablet, Take 2.5 mg by mouth daily, Disp: , Rfl:   •  atorvastatin (LIPITOR) 20 mg tablet, Take 20 mg by mouth daily at bedtime, Disp: , Rfl:   •  carvedilol (COREG) 3.125 mg tablet, Take 3.125 mg by mouth 2 (two) times a day, Disp: , Rfl:   •  Cholecalciferol 25 MCG (1000 UT) tablet, Take 1 tablet by mouth daily, Disp: , Rfl:   •  citalopram (CeleXA) 10 mg tablet, Take 10 mg by mouth daily, Disp: , Rfl:   •  divalproex sodium (DEPAKOTE ER) 500 mg 24 hr tablet, Take 500 mg by mouth daily at bedtime 2 tabs at bedtime, Disp: , Rfl:   •  escitalopram (LEXAPRO) 20 mg tablet, Take 20 mg by mouth daily, Disp: , Rfl:   •  folic acid (FOLVITE) 1 mg tablet, Take 1,000 mcg by mouth daily, Disp: , Rfl:   •  furosemide (LASIX) 40 mg tablet, Take 40 mg by mouth daily, Disp: , Rfl:   •  gabapentin (NEURONTIN) 600 MG tablet, Take 600 mg by mouth 4 (four) times a day, Disp: , Rfl:   •  K Phos [place]-Sod Phos Di & Mono (Phospha 250 Neutral) 155-852-130 MG TABS, Take 1 tablet by mouth 2 (two) times a day, Disp: , Rfl:   •  metFORMIN (GLUCOPHAGE) 500 mg tablet, Take 500 mg by mouth 3 (three) times a day with meals, Disp:  , Rfl:   •  Mounjaro 2.5 MG/0.5ML, Inject 2.5 mg Subcutaneous weekly, Disp: , Rfl:   •  Mounjaro 5 MG/0.5ML, every 7 days On Fridays, Disp: , Rfl:   •  nystatin powder, apply as directed External Once a Day as Needed for 30 days, Disp: , Rfl:   •  omeprazole (PriLOSEC) 20 mg delayed release capsule, Take 20 mg by mouth daily, Disp: , Rfl:   •  oxyCODONE (Roxicodone) 5 immediate release tablet, Take 1 tablet (5 mg total) by mouth every 6 (six) hours as needed for severe pain for up to 10 days Max Daily Amount: 20 mg, Disp: 10 tablet, Rfl: 0  •  oxygen gas, Inhale 4 L/min continuous Uses 4 L, Disp: , Rfl:   •  Potassium Chloride ER 20 MEQ TBCR, Take 1 tablet by mouth 2 (two) times a day with meals, Disp: , Rfl:   •  QUEtiapine (SEROquel) 100 mg tablet, Take 100 mg by mouth daily at bedtime, Disp: , Rfl:     Objective:    Blood pressure 120/76, pulse 77, temperature 97.5 °F (36.4 °C), temperature source Temporal, resp. rate 16, height 5' (1.524 m), weight 89.1 kg (196 lb 6.4 oz), SpO2 99%, not currently breastfeeding.  Body mass index is 38.36 kg/m².  Body surface area is 1.85 meters squared.    Physical Exam  Vitals reviewed.   Constitutional:       General: She is not in acute distress.     Appearance: Normal appearance. She is obese.   HENT:      Head: Normocephalic and atraumatic.      Mouth/Throat:      Mouth: Mucous membranes are moist.   Pulmonary:      Effort: Pulmonary effort is normal.      Breath sounds: Normal breath sounds.   Abdominal:      Palpations: Abdomen is soft. There is no mass.      Tenderness: There is no abdominal tenderness.   Skin:     General: Skin is warm and dry.      Comments: Surgical trocar sites are intact, clean and dry without induration, erythema or purulent drainage.    Neurological:      Mental Status: She is alert and oriented to person, place, and time.   Psychiatric:         Mood and Affect: Mood normal.         Behavior: Behavior normal.         Thought Content: Thought  content normal.         Judgment: Judgment normal.

## 2024-02-13 ENCOUNTER — TELEPHONE (OUTPATIENT)
Dept: HEMATOLOGY ONCOLOGY | Facility: CLINIC | Age: 69
End: 2024-02-13

## 2024-02-13 NOTE — TELEPHONE ENCOUNTER
Patient Call    Who are you speaking with? Vikas     If it is not the patient, are they listed on an active communication consent form? N/A   What is the reason for this call? Julito is calling to speak to the office regarding the patient getting her blood drawn from home and missing a kit for the blood draw.   Does this require a call back? Yes   If a call back is required, please list best call back number [phone]   If a call back is required, advise that a message will be forwarded to their care team and someone will return their call as soon as possible.   Did you relay this information to the patient? Yes

## 2024-02-14 NOTE — TELEPHONE ENCOUNTER
Return call placed VM message letting them know that Katie will contact them directly related to blood draw and they will also supply the required blood draw kit.

## 2024-02-15 ENCOUNTER — LAB REQUISITION (OUTPATIENT)
Dept: LAB | Facility: HOSPITAL | Age: 69
End: 2024-02-15

## 2024-02-15 DIAGNOSIS — C55 MALIGNANT NEOPLASM OF UTERUS, PART UNSPECIFIED (HCC): ICD-10-CM

## 2024-02-15 DIAGNOSIS — N85.02 ENDOMETRIAL INTRAEPITHELIAL NEOPLASIA (EIN): ICD-10-CM

## 2024-02-21 ENCOUNTER — HOSPITAL ENCOUNTER (OUTPATIENT)
Dept: CT IMAGING | Facility: HOSPITAL | Age: 69
Discharge: HOME | End: 2024-02-21
Attending: OBSTETRICS & GYNECOLOGY
Payer: COMMERCIAL

## 2024-02-21 DIAGNOSIS — C54.1 ENDOMETRIAL CANCER (HCC): ICD-10-CM

## 2024-02-21 PROCEDURE — 74177 CT ABD & PELVIS W/CONTRAST: CPT

## 2024-02-21 PROCEDURE — G1004 CDSM NDSC: HCPCS

## 2024-02-21 PROCEDURE — 71260 CT THORAX DX C+: CPT

## 2024-02-21 RX ADMIN — IOHEXOL 80 ML: 350 INJECTION, SOLUTION INTRAVENOUS at 18:14

## 2024-02-29 ENCOUNTER — OFFICE VISIT (OUTPATIENT)
Age: 69
End: 2024-02-29

## 2024-02-29 VITALS
BODY MASS INDEX: 38.64 KG/M2 | TEMPERATURE: 97.6 F | HEART RATE: 88 BPM | WEIGHT: 196.8 LBS | SYSTOLIC BLOOD PRESSURE: 122 MMHG | DIASTOLIC BLOOD PRESSURE: 88 MMHG | HEIGHT: 60 IN

## 2024-02-29 DIAGNOSIS — R11.2 NAUSEA AND VOMITING, UNSPECIFIED VOMITING TYPE: ICD-10-CM

## 2024-02-29 DIAGNOSIS — C54.1 ENDOMETRIAL CANCER (HCC): Primary | ICD-10-CM

## 2024-02-29 PROBLEM — N95.0 PMB (POSTMENOPAUSAL BLEEDING): Status: RESOLVED | Noted: 2023-10-02 | Resolved: 2024-02-29

## 2024-02-29 PROCEDURE — 99024 POSTOP FOLLOW-UP VISIT: CPT | Performed by: OBSTETRICS & GYNECOLOGY

## 2024-02-29 NOTE — ASSESSMENT & PLAN NOTE
68-year-old status post robotic assisted total laparoscopic hysterectomy, bilateral salpingo-oophorectomy, cystoscopy 1/23/2024 for a final pathologic diagnosis of stage Ib grade 1 endometrial cancer, no LVSI, p53 wild-type, pMMR.  She has chronic nausea with vomiting, diarrhea.  She is on chronic oxygen therapy.  I reviewed CT chest abdomen pelvis images.  She is recovering well from surgery.  Her performance status is 3.  1.  I reviewed that the primary tumor has been removed.  There is no visible residual disease.  I reviewed that adjuvant vaginal brachytherapy is meant to reduce the risk of local recurrence.  2.  Will follow-up final CT read-currently no evidence of visible lymphadenopathy, ascites, peritoneal disease.  3.  Referral to radiation oncology to discuss vaginal brachytherapy  4.  Return in 3 months for endometrial cancer surveillance.

## 2024-02-29 NOTE — ASSESSMENT & PLAN NOTE
CT imaging does not reveal any evidence of abscess, collection, small bowel obstruction.  Contrast attribution is appropriate.  Will refer to GI for evaluation as she may have gastritis/gastroenteritis.  She is currently on omeprazole daily.

## 2024-02-29 NOTE — PROGRESS NOTES
Assessment/Plan:    Problem List Items Addressed This Visit          Digestive    Nausea and vomiting     CT imaging does not reveal any evidence of abscess, collection, small bowel obstruction.  Contrast attribution is appropriate.  Will refer to GI for evaluation as she may have gastritis/gastroenteritis.  She is currently on omeprazole daily.         Relevant Orders    Ambulatory referral to Gastroenterology       Genitourinary    Endometrial cancer (HCC) - Primary     68-year-old status post robotic assisted total laparoscopic hysterectomy, bilateral salpingo-oophorectomy, cystoscopy 1/23/2024 for a final pathologic diagnosis of stage Ib grade 1 endometrial cancer, no LVSI, p53 wild-type, pMMR.  She has chronic nausea with vomiting, diarrhea.  She is on chronic oxygen therapy.  I reviewed CT chest abdomen pelvis images.  She is recovering well from surgery.  Her performance status is 3.  1.  I reviewed that the primary tumor has been removed.  There is no visible residual disease.  I reviewed that adjuvant vaginal brachytherapy is meant to reduce the risk of local recurrence.  2.  Will follow-up final CT read-currently no evidence of visible lymphadenopathy, ascites, peritoneal disease.  3.  Referral to radiation oncology to discuss vaginal brachytherapy  4.  Return in 3 months for endometrial cancer surveillance.         Relevant Orders    Ambulatory referral to Radiation Oncology    CBC and differential    Comprehensive metabolic panel         CHIEF COMPLAINT:       Problem:  Cancer Staging   Endometrial cancer ([facility])  Staging form: Corpus Uteri - Carcinoma, AJCC 8th Edition  - Pathologic stage from 1/23/2024: FIGO Stage IB, calculated as Stage Unknown (pT1b, pNX, cM0) - Signed by Julito Leonard MD on 2/1/2024        Previous therapy:  Oncology History   Endometrial cancer ([facility])   11/8/2023 Initial Diagnosis    Endometrial cancer ([facility])     1/23/2024 -  Cancer Staged    Staging form: Corpus Uteri -  "Carcinoma, AJCC 8th Edition  - Pathologic stage from 1/23/2024: FIGO Stage IB, calculated as Stage Unknown (pT1b, pNX, cM0) - Signed by Julito Leonard MD on 2/1/2024  Histologic grade (G): G1  Histologic grading system: 3 grade system  Lymph-vascular invasion (LVI): LVI not present (absent)/not identified       1/23/2024 Surgery    HYSTERECTOMY LAPAROSCOPIC TOTAL (LTH) W/ ROBOTICS. BILATERAL SALPINGO-OOPHORECTOMY, CYSTOSCOPY   Grade 1, 13 out of 15 mm depth of invasion, no LVSI, p53 wild-type. [facility]           Patient ID: Deloris Snowden is a 68 y.o. female  Returns for postoperative evaluation.  She has been incredibly anxious after leaving the office from her last visit because there was concern that there was cancer left behind and that she had existing disease that had to be treated with radiation therapy.  She has been having nausea and vomiting essentially since surgery.  She is voiding.  She is having diarrhea.  No vaginal bleeding.  She has been afebrile.  She feels overall \"sick as a dog.\"  She is taking omeprazole daily.  She had a CT scan performed of the chest abdomen pelvis 2/21/2024.  The final read is pending.  I reviewed the images.  There is a small amount of fluid in the pelvis.  No evidence of ascites, lymphadenopathy, peritoneal carcinomatosis.  There was a small right lower lobe pulmonary nodule.  No other interval change in medications or medical history since her last visit to the office.        The following portions of the patient's history were reviewed and updated as appropriate: allergies, current medications, past family history, past medical history, past social history, past surgical history, and problem list.    Review of Systems   Constitutional:  Negative for activity change and unexpected weight change.   HENT: Negative.     Eyes: Negative.    Respiratory:  Positive for shortness of breath.    Cardiovascular: Negative.    Gastrointestinal:  Positive for diarrhea, nausea and " vomiting. Negative for abdominal distention and abdominal pain.   Endocrine: Negative.    Genitourinary:  Negative for pelvic pain and vaginal bleeding.   Musculoskeletal: Negative.    Skin: Negative.    Allergic/Immunologic: Negative.    Neurological: Negative.    Hematological: Negative.    Psychiatric/Behavioral:  Positive for sleep disturbance. The patient is nervous/anxious.        Current Outpatient Medications   Medication Sig Dispense Refill    albuterol (PROVENTIL HFA,VENTOLIN HFA) 90 mcg/act inhaler as needed .      allopurinol (ZYLOPRIM) 100 mg tablet Take 100 mg by mouth 2 (two) times a day      amLODIPine (NORVASC) 2.5 mg tablet Take 2.5 mg by mouth daily      atorvastatin (LIPITOR) 20 mg tablet Take 20 mg by mouth daily at bedtime      carvedilol (COREG) 3.125 mg tablet Take 3.125 mg by mouth 2 (two) times a day      Cholecalciferol 25 MCG (1000 UT) tablet Take 1 tablet by mouth daily      citalopram (CeleXA) 10 mg tablet Take 10 mg by mouth daily      divalproex sodium (DEPAKOTE ER) 500 mg 24 hr tablet Take 500 mg by mouth daily at bedtime 2 tabs at bedtime      escitalopram (LEXAPRO) 20 mg tablet Take 20 mg by mouth daily      folic acid (FOLVITE) 1 mg tablet Take 1,000 mcg by mouth daily      furosemide (LASIX) 40 mg tablet Take 40 mg by mouth daily      gabapentin (NEURONTIN) 600 MG tablet Take 600 mg by mouth 4 (four) times a day      K Phos [place]-Sod Phos Di & Mono (Phospha 250 Neutral) 155-852-130 MG TABS Take 1 tablet by mouth 2 (two) times a day      metFORMIN (GLUCOPHAGE) 500 mg tablet Take 500 mg by mouth 3 (three) times a day with meals      Mounjaro 2.5 MG/0.5ML Inject 2.5 mg Subcutaneous weekly      Mounjaro 5 MG/0.5ML every 7 days On Fridays      nystatin powder apply as directed External Once a Day as Needed for 30 days      omeprazole (PriLOSEC) 20 mg delayed release capsule Take 20 mg by mouth daily      oxygen gas Inhale 4 L/min continuous Uses 4 L      Potassium Chloride ER 20 MEQ  "TBCR Take 1 tablet by mouth 2 (two) times a day with meals      QUEtiapine (SEROquel) 100 mg tablet Take 100 mg by mouth daily at bedtime       No current facility-administered medications for this visit.           Objective:    Blood pressure 122/88, pulse 88, temperature 97.6 °F (36.4 °C), height 5' (1.524 m), weight 89.3 kg (196 lb 12.8 oz), not currently breastfeeding.  Body mass index is 38.43 kg/m².  Body surface area is 1.85 meters squared.    Physical Exam  Vitals reviewed. Exam conducted with a chaperone present.   Constitutional:       General: She is not in acute distress.     Appearance: Normal appearance. She is not ill-appearing.   HENT:      Head: Normocephalic and atraumatic.      Mouth/Throat:      Mouth: Mucous membranes are moist.   Abdominal:      Palpations: Abdomen is soft. There is no mass.      Tenderness: There is no abdominal tenderness.   Genitourinary:     Comments: The external female genitalia is normal. The bartholin's, uretheral and skenes glands are normal. The urethral meatus is normal (midline with no lesions). Anus without fissure or lesion. Speculum exam reveals a grossly normal vagina. Vagina is intact, without dehiscense. No masses, lesions, discharge or bleeding. No significant cystocele or rectocele noted. Bimanual exam notes a surgical absent cervix, uterus and adnexal structures. No masses or fullness. Bladder is without fullness, mass or tenderness.  Musculoskeletal:      Right lower leg: No edema.      Left lower leg: No edema.   Skin:     General: Skin is warm and dry.      Coloration: Skin is pale.      Comments: Surgical trocar sites are well healed.    Neurological:      Mental Status: She is alert and oriented to person, place, and time.   Psychiatric:         Mood and Affect: Mood normal.         Behavior: Behavior normal.         Thought Content: Thought content normal.         Judgment: Judgment normal.         No results found for: \"\"  Lab Results "   Component Value Date    K 4.0 01/24/2024     01/24/2024    CO2 30 01/24/2024    BUN 14 01/24/2024    CREATININE 1.11 01/24/2024    GLUF 128 (H) 01/24/2024    CALCIUM 8.5 01/24/2024    AST 23 11/16/2023    ALT 12 11/16/2023    ALKPHOS 92 11/16/2023    EGFR 50 01/24/2024     Lab Results   Component Value Date    WBC 5.45 01/24/2024    HGB 9.2 (L) 01/24/2024    HCT 28.0 (L) 01/24/2024     (H) 01/24/2024     (L) 01/24/2024

## 2024-03-04 ENCOUNTER — TELEPHONE (OUTPATIENT)
Dept: HEMATOLOGY ONCOLOGY | Facility: CLINIC | Age: 69
End: 2024-03-04

## 2024-03-04 ENCOUNTER — TELEPHONE (OUTPATIENT)
Dept: GYNECOLOGIC ONCOLOGY | Facility: CLINIC | Age: 69
End: 2024-03-04

## 2024-03-04 NOTE — TELEPHONE ENCOUNTER
Pt's son confirmed name and  of his mother (the pt). Vikas (son) was asked if they had received a CD from the CT scan done in radiology. Vikas confirmed when they went to radiology at [place] they did not receive one. I informed Vikas that I will call and get the CT to the Radiology for her appointment on 3/12/24. Pt's son understood and confirmed.

## 2024-03-04 NOTE — TELEPHONE ENCOUNTER
Patient Call    Who are you speaking with? Physician Office    If it is not the patient, are they listed on an active communication consent form? N/A   What is the reason for this call? Radiology calling to see if the pt bought a CD with them to the CT scan appt.    Does this require a call back? Yes   If a call back is required, please list best call back number [phone] Vicente    If a call back is required, advise that a message will be forwarded to their care team and someone will return their call as soon as possible.   Did you relay this information to the patient? Yes

## 2024-03-05 LAB — SCAN RESULT: NORMAL

## 2024-03-12 ENCOUNTER — RADIATION ONCOLOGY CONSULT (OUTPATIENT)
Facility: HOSPITAL | Age: 69
End: 2024-03-12
Payer: COMMERCIAL

## 2024-03-12 VITALS
HEIGHT: 60 IN | TEMPERATURE: 97.8 F | BODY MASS INDEX: 37.62 KG/M2 | RESPIRATION RATE: 16 BRPM | WEIGHT: 191.6 LBS | HEART RATE: 75 BPM | DIASTOLIC BLOOD PRESSURE: 49 MMHG | OXYGEN SATURATION: 97 % | SYSTOLIC BLOOD PRESSURE: 118 MMHG

## 2024-03-12 DIAGNOSIS — C54.1 ENDOMETRIAL CANCER (HCC): ICD-10-CM

## 2024-03-12 PROCEDURE — 99211 OFF/OP EST MAY X REQ PHY/QHP: CPT | Performed by: RADIOLOGY

## 2024-03-12 PROCEDURE — 99204 OFFICE O/P NEW MOD 45 MIN: CPT | Performed by: RADIOLOGY
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
## 2024-03-19 ENCOUNTER — TELEPHONE (OUTPATIENT)
Dept: HEMATOLOGY ONCOLOGY | Facility: CLINIC | Age: 69
End: 2024-03-19

## 2024-03-19 NOTE — TELEPHONE ENCOUNTER
Patient Call    Who are you speaking with? Insurance Company    If it is not the patient, are they listed on an active communication consent form? N/A   What is the reason for this call? Jason hassan [employer] is calling with information regarding a peer to peer review. They need a call back before 3/21/24 at 1:00 Pm regarding case number [id number]. SPT code for test is 0340U. Please call [employer] back for this peer to peer review.    Does this require a call back? Yes   If a call back is required, please list best call back number [phone]  Ext [phone]   If a call back is required, advise that a message will be forwarded to their care team and someone will return their call as soon as possible.   Did you relay this information to the patient? Yes

## 2024-03-21 ENCOUNTER — TELEPHONE (OUTPATIENT)
Dept: GYNECOLOGIC ONCOLOGY | Facility: CLINIC | Age: 69
End: 2024-03-21

## 2024-03-21 ENCOUNTER — TELEPHONE (OUTPATIENT)
Facility: HOSPITAL | Age: 69
End: 2024-03-21

## 2024-03-21 NOTE — TELEPHONE ENCOUNTER
Called and spoke to nurse Pimentel with [employer] who stated they need to do a P2P for the patient's signatera test. I passed along the message to the provider.

## 2024-03-21 NOTE — TELEPHONE ENCOUNTER
Can you please review this patient's chart?  I am unclear what a P2P is needed for. She had CT imaging on 2/21.   Can you let me know if you have any additional insight?  Thanks!

## 2024-04-02 ENCOUNTER — TELEPHONE (OUTPATIENT)
Facility: HOSPITAL | Age: 69
End: 2024-04-02

## 2024-04-02 NOTE — TELEPHONE ENCOUNTER
Patient's son returned call and states they would like to hold off on radiation treatment for now.

## 2024-05-09 ENCOUNTER — TELEPHONE (OUTPATIENT)
Dept: GYNECOLOGIC ONCOLOGY | Facility: CLINIC | Age: 69
End: 2024-05-09

## 2024-05-09 ENCOUNTER — TELEPHONE (OUTPATIENT)
Dept: HEMATOLOGY ONCOLOGY | Facility: CLINIC | Age: 69
End: 2024-05-09

## 2024-05-09 NOTE — TELEPHONE ENCOUNTER
Medical Records Request   Who are you speaking with? Insurance Company   If it is not the patient, are they listed on an active communication consent form? Yes   What is the purpose of this call? Requesting medical records   What records are being requested? Office notes   Details/ Additional Info Office notes, treatment plan, lab results, history and physical exams to determine necessity of genetic testing   Which provider does the patient see? Dr. Leonard   Fax Number   Person's name (Attention:)   [phone] Attn: Ref # [id number]   Facility call back number [phone]    Patient call back number na

## 2024-05-30 ENCOUNTER — TELEPHONE (OUTPATIENT)
Age: 69
End: 2024-05-30

## 2024-05-30 ENCOUNTER — TELEPHONE (OUTPATIENT)
Dept: GYNECOLOGIC ONCOLOGY | Facility: CLINIC | Age: 69
End: 2024-05-30

## 2024-05-30 NOTE — TELEPHONE ENCOUNTER
Patient's son called TEAMS# [phone] to get the number for billing. Patient has appt scheduled for today however patient's appt needed to be reschedule. Patient was rescheduled to 06/27/24

## 2024-05-30 NOTE — TELEPHONE ENCOUNTER
Called patient and left VM to reschedule appt for today, Dr. Leonard has to go OR emergently, requested a call back.

## 2024-06-05 ENCOUNTER — TELEPHONE (OUTPATIENT)
Dept: HEMATOLOGY ONCOLOGY | Facility: CLINIC | Age: 69
End: 2024-06-05

## 2024-06-05 NOTE — TELEPHONE ENCOUNTER
Looks like those labs were to be done after her last visit. She doesn't need to have them done prior to the appointment. Thanks!

## 2024-06-05 NOTE — TELEPHONE ENCOUNTER
Patient Call    Who are you speaking with? Child    If it is not the patient, are they listed on an active communication consent form? Yes   What is the reason for this call? Vikas is calling regarding his mom's mobile lab appt.  Vikas states that her appt was canceled and he was wondering if she needed labs for her upcoming appt with Dr Leonard   Does this require a call back? Yes   If a call back is required, please list best call back number [phone]    If a call back is required, advise that a message will be forwarded to their care team and someone will return their call as soon as possible.   Did you relay this information to the patient? Yes

## 2024-06-27 ENCOUNTER — OFFICE VISIT (OUTPATIENT)
Age: 69
End: 2024-06-27
Payer: COMMERCIAL

## 2024-06-27 VITALS
HEART RATE: 82 BPM | SYSTOLIC BLOOD PRESSURE: 118 MMHG | DIASTOLIC BLOOD PRESSURE: 64 MMHG | HEIGHT: 60 IN | WEIGHT: 190 LBS | RESPIRATION RATE: 18 BRPM | OXYGEN SATURATION: 98 % | BODY MASS INDEX: 37.3 KG/M2 | TEMPERATURE: 98.6 F

## 2024-06-27 DIAGNOSIS — C54.1 ENDOMETRIAL CANCER (HCC): Primary | ICD-10-CM

## 2024-06-27 PROCEDURE — 99213 OFFICE O/P EST LOW 20 MIN: CPT | Performed by: OBSTETRICS & GYNECOLOGY

## 2024-06-27 NOTE — PROGRESS NOTES
Assessment/Plan:    Problem List Items Addressed This Visit          Genitourinary    Endometrial cancer (HCC) - Primary     69-year-old with stage Ib grade 1 endometrial cancer, COPD on chronic oxygen therapy.  MRD testing has been negative x 4 most recently 6/13/2024.  She declined adjuvant vaginal brachytherapy.  She is clinically without evidence of disease recurrence.  Her performance status is improving and is a 2.  1.  Continue MRD testing  2.  Return in 3 months for endometrial cancer surveillance.              CHIEF COMPLAINT: Endometrial cancer surveillance      Problem:  Cancer Staging   Endometrial cancer ([facility])  Staging form: Corpus Uteri - Carcinoma, AJCC 8th Edition  - Pathologic stage from 1/23/2024: FIGO Stage IB, calculated as Stage Unknown (pT1b, pNX, cM0) - Signed by Julito Leonard MD on 2/1/2024        Previous therapy:  Oncology History   Endometrial cancer ([facility])   10/2/2023 Biopsy    Diagnosis:  ATYICAL GLANDULAR PROLIFERATION, FAVOR ENDOMETRIAL ORIGIN, SUGGESTIVE OF   COMPLEX ATYPICAL MUCINOUS METAPLASIA.  THE POSSIBILITY OF A MORE SERIOUS   LESION CANNOT BE EXCLUDED.  FURTHER TISSUE STUDES ARE SUGGESTED.      11/8/2023 Initial Diagnosis    Endometrial cancer ([facility])     1/23/2024 -  Cancer Staged    Staging form: Corpus Uteri - Carcinoma, AJCC 8th Edition  - Pathologic stage from 1/23/2024: FIGO Stage IB, calculated as Stage Unknown (pT1b, pNX, cM0) - Signed by Julito Leonard MD on 2/1/2024  Histologic grade (G): G1  Histologic grading system: 3 grade system  Lymph-vascular invasion (LVI): LVI not present (absent)/not identified       1/23/2024 Surgery    HYSTERECTOMY LAPAROSCOPIC TOTAL (LTH) W/ ROBOTICS. BILATERAL SALPINGO-OOPHORECTOMY, CYSTOSCOPY   Grade 1, 13 out of 15 mm depth of invasion, no LVSI, p53 wild-type. [facility]           Patient ID: Deloris Snowden is a 69 y.o. female  Who returns for endometrial cancer surveillance.  She has noted an improvement in her activity  level.  She is on chronic oxygen therapy.  She has lost weight while on Mounjaro.  She does have back pain which is chronic.  MRD testing from 6/13/2024 was negative.  This was her fourth negative test.  She met with radiation oncology and declined vaginal brachytherapy.  She does not have pelvic pain or vaginal bleeding.  No other interval change in medications or medical history since her last visit.  She no longer has nausea/vomiting/diarrhea.        The following portions of the patient's history were reviewed and updated as appropriate: allergies, current medications, past family history, past medical history, past social history, past surgical history, and problem list.    Review of Systems   Constitutional:  Negative for activity change and unexpected weight change.   HENT: Negative.     Eyes: Negative.    Respiratory:  Positive for shortness of breath.    Cardiovascular: Negative.    Gastrointestinal:  Negative for abdominal distention and abdominal pain.   Endocrine: Negative.    Genitourinary:  Negative for pelvic pain and vaginal bleeding.   Musculoskeletal:  Positive for arthralgias and gait problem.   Skin: Negative.    Allergic/Immunologic: Negative.    Hematological: Negative.    Psychiatric/Behavioral: Negative.         Current Outpatient Medications   Medication Sig Dispense Refill    albuterol (PROVENTIL HFA,VENTOLIN HFA) 90 mcg/act inhaler as needed .      allopurinol (ZYLOPRIM) 100 mg tablet Take 100 mg by mouth 2 (two) times a day      atorvastatin (LIPITOR) 20 mg tablet Take 20 mg by mouth daily at bedtime      carvedilol (COREG) 3.125 mg tablet Take 3.125 mg by mouth 2 (two) times a day      Cholecalciferol 25 MCG (1000 UT) tablet Take 1 tablet by mouth daily      citalopram (CeleXA) 10 mg tablet Take 10 mg by mouth daily      divalproex sodium (DEPAKOTE ER) 500 mg 24 hr tablet Take 500 mg by mouth daily at bedtime 2 tabs at bedtime      folic acid (FOLVITE) 1 mg tablet Take 1,000 mcg by mouth  daily      furosemide (LASIX) 40 mg tablet Take 40 mg by mouth daily      gabapentin (NEURONTIN) 600 MG tablet Take 600 mg by mouth 3 (three) times a day 3-4 times a day      K Phos [place]-Sod Phos Di & Mono (Phospha 250 Neutral) 155-852-130 MG TABS Take 1 tablet by mouth 2 (two) times a day      metFORMIN (GLUCOPHAGE) 500 mg tablet Take 500 mg by mouth 3 (three) times a day with meals      Mounjaro 5 MG/0.5ML every 7 days On Fridays      nystatin powder apply as directed External Once a Day as Needed for 30 days      omeprazole (PriLOSEC) 20 mg delayed release capsule Take 20 mg by mouth daily      oxygen gas Inhale 4 L/min continuous Uses 4 L      Potassium Chloride ER 20 MEQ TBCR Take 1 tablet by mouth 2 (two) times a day with meals      QUEtiapine (SEROquel) 100 mg tablet Take 100 mg by mouth daily at bedtime      amLODIPine (NORVASC) 2.5 mg tablet Take 2.5 mg by mouth daily (Patient not taking: Reported on 3/12/2024)      escitalopram (LEXAPRO) 20 mg tablet Take 20 mg by mouth daily (Patient not taking: Reported on 3/12/2024)      Mounjaro 2.5 MG/0.5ML Inject 2.5 mg Subcutaneous weekly (Patient not taking: Reported on 3/12/2024)       No current facility-administered medications for this visit.           Objective:    Blood pressure 118/64, pulse 82, temperature 98.6 °F (37 °C), resp. rate 18, height 5' (1.524 m), weight 86.2 kg (190 lb), SpO2 98%, not currently breastfeeding.  Body mass index is 37.11 kg/m².  Body surface area is 1.83 meters squared.    Physical Exam  Vitals reviewed. Exam conducted with a chaperone present.   Constitutional:       General: She is not in acute distress.     Appearance: Normal appearance. She is well-developed. She is not ill-appearing, toxic-appearing or diaphoretic.   HENT:      Head: Normocephalic and atraumatic.   Eyes:      General: No scleral icterus.     Extraocular Movements: Extraocular movements intact.      Conjunctiva/sclera: Conjunctivae normal.   Neck:      Thyroid:  No thyromegaly.   Pulmonary:      Effort: Pulmonary effort is normal.   Abdominal:      General: There is no distension.      Palpations: Abdomen is soft. There is no mass.      Tenderness: There is no abdominal tenderness. There is no guarding or rebound.   Genitourinary:     Comments: The external female genitalia is normal. The bartholin's, uretheral and skenes glands are normal. The urethral meatus is normal (midline with no lesions). Anus without fissure or lesion. Speculum exam reveals a grossly normal vagina. No masses, lesions,discharge or bleeding. No significant cystocele or rectocele noted. Bimanual exam notes a surgical absent cervix, uterus and adnexal structures. No masses or fullness. Bladder is without fullness, mass or tenderness.    Musculoskeletal:         General: No swelling or tenderness.      Cervical back: Normal range of motion and neck supple.      Right lower leg: Edema present.      Left lower leg: Edema present.   Lymphadenopathy:      Cervical: No cervical adenopathy.   Skin:     General: Skin is warm and dry.      Coloration: Skin is not jaundiced or pale.      Findings: No lesion or rash.   Neurological:      General: No focal deficit present.      Mental Status: She is alert and oriented to person, place, and time. Mental status is at baseline.      Cranial Nerves: No cranial nerve deficit.      Motor: No weakness.      Gait: Gait normal.   Psychiatric:         Mood and Affect: Mood normal.         Behavior: Behavior normal.         Thought Content: Thought content normal.         Judgment: Judgment normal.

## 2024-07-03 ENCOUNTER — TELEPHONE (OUTPATIENT)
Age: 69
End: 2024-07-03

## 2024-07-03 NOTE — TELEPHONE ENCOUNTER
Blake ROMERO Case manager with [employer] calling to inform provider that a peer to peer review is needed for patient to be approved for genetic testing for Cancer. Reference #[id number]. Peer to peer documentation will be faxed and will need to be completed by 7/8/24 at 1:00 pm EST. If there are any questions please contact Blake at [phone] ext [phone]

## 2024-07-05 NOTE — TELEPHONE ENCOUNTER
"Return call placed. Am told that P2P reviewer will call back \"at any time.\" Phone number [phone] provided.  "

## 2024-07-09 ENCOUNTER — TELEPHONE (OUTPATIENT)
Age: 69
End: 2024-07-09

## 2024-07-09 NOTE — TELEPHONE ENCOUNTER
Can someone assist with what this P2P is for? I do not see anything pending. Thanks!     Latoya, if PEC can't assist, and you help me?     Thanks!

## 2024-07-09 NOTE — TELEPHONE ENCOUNTER
Called [employer] to obtain more information on Peer to Peer that is needed. The peer to peer is for signatera. Denial reason is - Medicare approves for breast ca, colon ca, & bladder ca, unless patient is on an immune check point inhibitor. Patient is not on an immune check point inhibitor.

## 2024-07-09 NOTE — TELEPHONE ENCOUNTER
Yovany from [facility] called to speak to Sadie for a peer to peer. He stated has to be 4:30 eastern time and no later than noon stephen 7/10/24    Callback is [phone]

## 2024-09-11 ENCOUNTER — TELEPHONE (OUTPATIENT)
Dept: GYNECOLOGIC ONCOLOGY | Facility: CLINIC | Age: 69
End: 2024-09-11

## 2024-09-11 NOTE — TELEPHONE ENCOUNTER
LMOM for the pt's appointment having to be rescheduled from 9/26 @ 145 pm to 10/10 @ 345 pm due to Dr. Leonard's schedule closing.     Left call back number if pt cannot keep this date and time

## 2024-10-01 ENCOUNTER — HOSPITAL ENCOUNTER (OUTPATIENT)
Dept: HOSPITAL 99 - PAVMRI | Age: 69
End: 2024-10-01
Payer: COMMERCIAL

## 2024-10-01 DIAGNOSIS — R51.9: ICD-10-CM

## 2024-10-01 DIAGNOSIS — M54.2: Primary | ICD-10-CM

## 2024-10-01 PROCEDURE — A9575 INJ GADOTERATE MEGLUMI 0.1ML: HCPCS

## 2024-10-02 ENCOUNTER — HOSPITAL ENCOUNTER (OUTPATIENT)
Dept: HOSPITAL 99 - PAVMRI | Age: 69
End: 2024-10-02
Payer: COMMERCIAL

## 2024-10-02 DIAGNOSIS — R51.9: ICD-10-CM

## 2024-10-02 DIAGNOSIS — M54.2: Primary | ICD-10-CM

## 2024-10-02 PROCEDURE — A9575 INJ GADOTERATE MEGLUMI 0.1ML: HCPCS

## 2024-10-10 ENCOUNTER — OFFICE VISIT (OUTPATIENT)
Age: 69
End: 2024-10-10
Payer: COMMERCIAL

## 2024-10-10 VITALS
OXYGEN SATURATION: 98 % | DIASTOLIC BLOOD PRESSURE: 60 MMHG | TEMPERATURE: 98.6 F | SYSTOLIC BLOOD PRESSURE: 118 MMHG | HEIGHT: 60 IN | BODY MASS INDEX: 37.97 KG/M2 | WEIGHT: 193.4 LBS | HEART RATE: 72 BPM | RESPIRATION RATE: 18 BRPM

## 2024-10-10 DIAGNOSIS — C54.1 ENDOMETRIAL CANCER (HCC): Primary | ICD-10-CM

## 2024-10-10 DIAGNOSIS — K59.04 CHRONIC IDIOPATHIC CONSTIPATION: ICD-10-CM

## 2024-10-10 PROCEDURE — 99213 OFFICE O/P EST LOW 20 MIN: CPT | Performed by: OBSTETRICS & GYNECOLOGY

## 2024-10-10 PROCEDURE — 99459 PELVIC EXAMINATION: CPT | Performed by: OBSTETRICS & GYNECOLOGY

## 2024-10-10 NOTE — ASSESSMENT & PLAN NOTE
69-year-old with stage Ib grade 1 endometrial cancer, COPD on chronic oxygen therapy.  MRD testing has been negative x 5 most recently 9/10/2024.  She is clinically without evidence of disease recurrence.  Her performance status is 2.  1.  Continue MRD testing  2.  Return in 3 months for endometrial cancer surveillance.

## 2024-10-10 NOTE — PROGRESS NOTES
Assessment/Plan:    Problem List Items Addressed This Visit          Digestive    Chronic idiopathic constipation     Continue stool softeners, start MiraLAX 17 g daily.  She was encouraged to increase the dose of MiraLAX if needed to 2-3 times daily.            Genitourinary    Endometrial cancer (HCC) - Primary     69-year-old with stage Ib grade 1 endometrial cancer, COPD on chronic oxygen therapy.  MRD testing has been negative x 5 most recently 9/10/2024.  She is clinically without evidence of disease recurrence.  Her performance status is 2.  1.  Continue MRD testing  2.  Return in 3 months for endometrial cancer surveillance.              CHIEF COMPLAINT: Constipation, endometrial cancer surveillance      Problem:  Cancer Staging   Endometrial cancer (Formerly [facility])  Staging form: Corpus Uteri - Carcinoma, AJCC 8th Edition  - Pathologic stage from 1/23/2024: FIGO Stage IB, calculated as Stage Unknown (pT1b, pNX, cM0) - Signed by Julito Leonard MD on 2/1/2024        Previous therapy:  Oncology History   Endometrial cancer (Formerly [facility])   10/2/2023 Biopsy    Diagnosis:  ATYICAL GLANDULAR PROLIFERATION, FAVOR ENDOMETRIAL ORIGIN, SUGGESTIVE OF   COMPLEX ATYPICAL MUCINOUS METAPLASIA.  THE POSSIBILITY OF A MORE SERIOUS   LESION CANNOT BE EXCLUDED.  FURTHER TISSUE STUDES ARE SUGGESTED.      11/8/2023 Initial Diagnosis    Endometrial cancer (Formerly [facility])     1/23/2024 -  Cancer Staged    Staging form: Corpus Uteri - Carcinoma, AJCC 8th Edition  - Pathologic stage from 1/23/2024: FIGO Stage IB, calculated as Stage Unknown (pT1b, pNX, cM0) - Signed by Julito Leonard MD on 2/1/2024  Histologic grade (G): G1  Histologic grading system: 3 grade system  Lymph-vascular invasion (LVI): LVI not present (absent)/not identified       1/23/2024 Surgery    HYSTERECTOMY LAPAROSCOPIC TOTAL (LTH) W/ ROBOTICS. BILATERAL SALPINGO-OOPHORECTOMY, CYSTOSCOPY   Grade 1, 13 out of 15 mm depth of invasion, no LVSI, p53 wild-type. pMMR            Patient ID: Deloris Snowden is a 69 y.o. female  Who returns for endometrial cancer surveillance.  She has been having headaches which she is palliating with cold packs.  She had an MRI of the brain performed on 10/1/2024 that did not reveal any evidence of malignancy.  There was age-related parenchymal atrophy and some ethmoid sinus inflammation.  She does not have any vaginal bleeding or discharge.  She has intentionally lost 30 pounds.  She continues on supplemental oxygen for COPD.        The following portions of the patient's history were reviewed and updated as appropriate: allergies, current medications, past family history, past medical history, past social history, past surgical history, and problem list.    Review of Systems   Constitutional:  Negative for activity change and unexpected weight change.   HENT: Negative.     Eyes: Negative.    Respiratory: Negative.     Cardiovascular:  Positive for leg swelling.   Gastrointestinal:  Negative for abdominal distention and abdominal pain.   Endocrine: Negative.    Genitourinary:  Negative for pelvic pain and vaginal bleeding.   Musculoskeletal: Negative.    Skin: Negative.    Allergic/Immunologic: Negative.    Neurological:  Positive for headaches.   Hematological: Negative.    Psychiatric/Behavioral: Negative.         Current Outpatient Medications   Medication Sig Dispense Refill    albuterol (PROVENTIL HFA,VENTOLIN HFA) 90 mcg/act inhaler as needed .      allopurinol (ZYLOPRIM) 100 mg tablet Take 100 mg by mouth 2 (two) times a day      atorvastatin (LIPITOR) 20 mg tablet Take 20 mg by mouth daily at bedtime      carvedilol (COREG) 3.125 mg tablet Take 3.125 mg by mouth 2 (two) times a day      Cholecalciferol 25 MCG (1000 UT) tablet Take 1 tablet by mouth daily      citalopram (CeleXA) 10 mg tablet Take 10 mg by mouth daily      divalproex sodium (DEPAKOTE ER) 500 mg 24 hr tablet Take 500 mg by mouth daily at bedtime 2 tabs at bedtime      folic acid  (FOLVITE) 1 mg tablet Take 1,000 mcg by mouth daily      furosemide (LASIX) 40 mg tablet Take 40 mg by mouth daily      gabapentin (NEURONTIN) 600 MG tablet Take 600 mg by mouth 3 (three) times a day 3-4 times a day      K Phos [place]-Sod Phos Di & Mono (Phospha 250 Neutral) 155-852-130 MG TABS Take 1 tablet by mouth 2 (two) times a day      metFORMIN (GLUCOPHAGE) 500 mg tablet Take 500 mg by mouth 3 (three) times a day with meals      Mounjaro 5 MG/0.5ML every 7 days On Fridays      nystatin powder apply as directed External Once a Day as Needed for 30 days      omeprazole (PriLOSEC) 20 mg delayed release capsule Take 20 mg by mouth daily      oxygen gas Inhale 4 L/min continuous Uses 4 L      Potassium Chloride ER 20 MEQ TBCR Take 1 tablet by mouth 2 (two) times a day with meals      QUEtiapine (SEROquel) 100 mg tablet Take 100 mg by mouth daily at bedtime      amLODIPine (NORVASC) 2.5 mg tablet Take 2.5 mg by mouth daily (Patient not taking: Reported on 3/12/2024)      escitalopram (LEXAPRO) 20 mg tablet Take 20 mg by mouth daily (Patient not taking: Reported on 3/12/2024)      Mounjaro 2.5 MG/0.5ML Inject 2.5 mg Subcutaneous weekly (Patient not taking: Reported on 3/12/2024)       No current facility-administered medications for this visit.           Objective:    Blood pressure 118/60, pulse 72, temperature 98.6 °F (37 °C), resp. rate 18, height 5' (1.524 m), weight 87.7 kg (193 lb 6.4 oz), SpO2 98%, not currently breastfeeding.  Body mass index is 37.77 kg/m².  Body surface area is 1.84 meters squared.    Physical Exam  Vitals reviewed. Exam conducted with a chaperone present.   Constitutional:       General: She is not in acute distress.     Appearance: Normal appearance. She is well-developed. She is not ill-appearing, toxic-appearing or diaphoretic.   HENT:      Head: Normocephalic and atraumatic.   Eyes:      General: No scleral icterus.     Extraocular Movements: Extraocular movements intact.       Conjunctiva/sclera: Conjunctivae normal.   Neck:      Thyroid: No thyromegaly.   Pulmonary:      Effort: Pulmonary effort is normal.   Abdominal:      General: There is no distension.      Palpations: Abdomen is soft. There is no mass.      Tenderness: There is no abdominal tenderness. There is no guarding or rebound.      Hernia: No hernia is present.   Genitourinary:     Comments: The external female genitalia is normal. The bartholin's, uretheral and skenes glands are normal. The urethral meatus is normal (midline with no lesions). Anus without fissure or lesion. Speculum exam reveals a grossly normal vagina. No masses, lesions,discharge or bleeding. No significant cystocele or rectocele noted. Bimanual exam notes a surgical absent cervix, uterus and adnexal structures. No masses or fullness. Bladder is without fullness, mass or tenderness.    Musculoskeletal:         General: No swelling or tenderness.      Cervical back: Normal range of motion and neck supple.      Right lower leg: Edema present.      Left lower leg: Edema present.      Comments: 1+ bilateral lower extremity edema   Lymphadenopathy:      Cervical: No cervical adenopathy.   Skin:     General: Skin is warm and dry.      Coloration: Skin is not jaundiced or pale.      Findings: Erythema present. No lesion or rash.      Comments: Pretibial erythema   Neurological:      General: No focal deficit present.      Mental Status: She is alert and oriented to person, place, and time. Mental status is at baseline.      Cranial Nerves: No cranial nerve deficit.      Motor: No weakness.      Gait: Gait abnormal.      Comments: Ambulates with the assistance of a walker.   Psychiatric:         Mood and Affect: Mood normal.         Behavior: Behavior normal.         Thought Content: Thought content normal.         Judgment: Judgment normal.

## 2024-10-10 NOTE — ASSESSMENT & PLAN NOTE
Continue stool softeners, start MiraLAX 17 g daily.  She was encouraged to increase the dose of MiraLAX if needed to 2-3 times daily.

## 2024-10-25 ENCOUNTER — TELEPHONE (OUTPATIENT)
Age: 69
End: 2024-10-25

## 2024-10-25 NOTE — TELEPHONE ENCOUNTER
Naheed hassan [facility] called asking a return call when you are free. Its in regards to documentation received for clinical records for a specific date and was sent back to them. They are not sure if it was to appeal a denial.    Please call her back at [phone] [phone]

## 2024-10-29 NOTE — TELEPHONE ENCOUNTER
Returned Naheed's phone call. Had to leave a  for her to call back. If she calls the [facility] again, please obtain the specific date and study/procedure she is requesting more information on.

## 2025-01-09 ENCOUNTER — OFFICE VISIT (OUTPATIENT)
Age: 70
End: 2025-01-09
Payer: COMMERCIAL

## 2025-01-09 ENCOUNTER — TELEPHONE (OUTPATIENT)
Age: 70
End: 2025-01-09

## 2025-01-09 VITALS
BODY MASS INDEX: 38.21 KG/M2 | RESPIRATION RATE: 18 BRPM | HEART RATE: 89 BPM | TEMPERATURE: 97.4 F | OXYGEN SATURATION: 95 % | SYSTOLIC BLOOD PRESSURE: 120 MMHG | HEIGHT: 60 IN | WEIGHT: 194.6 LBS | DIASTOLIC BLOOD PRESSURE: 60 MMHG

## 2025-01-09 DIAGNOSIS — C54.1 ENDOMETRIAL CANCER (HCC): Primary | ICD-10-CM

## 2025-01-09 DIAGNOSIS — R91.8 PULMONARY NODULES/LESIONS, MULTIPLE: ICD-10-CM

## 2025-01-09 DIAGNOSIS — E11.8 TYPE 2 DIABETES MELLITUS WITH UNSPECIFIED COMPLICATIONS (HCC): ICD-10-CM

## 2025-01-09 PROCEDURE — 99459 PELVIC EXAMINATION: CPT | Performed by: OBSTETRICS & GYNECOLOGY

## 2025-01-09 PROCEDURE — 99214 OFFICE O/P EST MOD 30 MIN: CPT | Performed by: OBSTETRICS & GYNECOLOGY

## 2025-01-09 RX ORDER — NITROGLYCERIN 0.4 MG/1
TABLET SUBLINGUAL
COMMUNITY

## 2025-01-09 RX ORDER — FLUTICASONE PROPIONATE 50 MCG
SPRAY, SUSPENSION (ML) NASAL
COMMUNITY

## 2025-01-09 NOTE — TELEPHONE ENCOUNTER
At check out, son stated that he will call and schedule CT since they would like to go to [facility].  The script was printed and given to the son.

## 2025-01-09 NOTE — PROGRESS NOTES
Name: Deloris Snowden      : 1955      MRN: [id number]  Encounter Provider: Julito Leonard MD  Encounter Date: 2025   Encounter department: [facility] GYNECOLOGY ONCOLOGY [facility]  :  Assessment & Plan  Endometrial cancer (HCC)  69-year-old with stage Ib grade 1 endometrial cancer, COPD on chronic oxygen therapy, MRD testing has been negative x 6 most recently 2024.  She is clinically without evidence of disease recurrence.  Her performance status is 2.  1.  Follow-up CT imaging of chest abdomen pelvis prior to next visit.  2.  Return in 3 months for endometrial cancer surveillance  3.  Continue MRD testing.  Orders:    BUN; Future    Creatinine, serum; Future    CT chest abdomen pelvis w contrast; Future    Pulmonary nodules/lesions, multiple  Continue to trend on repeat imaging.  Orders:    CT chest abdomen pelvis w contrast; Future    Type 2 diabetes mellitus with unspecified complications (HCC)    Lab Results   Component Value Date    HGBA1C 6.6 (H) 2023     Continue to monitor               History of Present Illness   Reason for Visit / CC: Endometrial cancer surveillance   Deloris Snowden is a 69 y.o. female   Who returns for endometrial cancer surveillance.  She does not have any vaginal bleeding, pelvic pain, abdominal pain.  She notes that her constipation has improved.  She is on chronic oxygen therapy.  She had MRD testing performed on 2024 which was negative.  Last CT in 2024 revealed multiple pulmonary nodules.  No other interval change in medications or medical history since her last visit.      Pertinent Medical History   COPD requiring oxygen   Oncology History   Cancer Staging   Endometrial cancer (HCC)  Staging form: Corpus Uteri - Carcinoma, AJCC 8th Edition  - Pathologic stage from 2024: FIGO Stage IB, calculated as Stage Unknown (pT1b, pNX, cM0) - Signed by Julito Leonard MD on 2024  Histologic grade  (G): G1  Histologic grading system: 3 grade system  Lymph-vascular invasion (LVI): LVI not present (absent)/not identified  Oncology History   Endometrial cancer (HCC)   10/2/2023 Biopsy    Diagnosis:  ATYICAL GLANDULAR PROLIFERATION, FAVOR ENDOMETRIAL ORIGIN, SUGGESTIVE OF   COMPLEX ATYPICAL MUCINOUS METAPLASIA.  THE POSSIBILITY OF A MORE SERIOUS   LESION CANNOT BE EXCLUDED.  FURTHER TISSUE STUDES ARE SUGGESTED.      11/8/2023 Initial Diagnosis    Endometrial cancer (HCC)     1/23/2024 -  Cancer Staged    Staging form: Corpus Uteri - Carcinoma, AJCC 8th Edition  - Pathologic stage from 1/23/2024: FIGO Stage IB, calculated as Stage Unknown (pT1b, pNX, cM0) - Signed by Julito Leonard MD on 2/1/2024  Histologic grade (G): G1  Histologic grading system: 3 grade system  Lymph-vascular invasion (LVI): LVI not present (absent)/not identified       1/23/2024 Surgery    HYSTERECTOMY LAPAROSCOPIC TOTAL (LTH) W/ ROBOTICS. BILATERAL SALPINGO-OOPHORECTOMY, CYSTOSCOPY   Grade 1, 13 out of 15 mm depth of invasion, no LVSI, p53 wild-type. pMMR        Review of Systems   Constitutional:  Negative for activity change and unexpected weight change.   HENT: Negative.     Eyes: Negative.    Respiratory:  Positive for shortness of breath.    Cardiovascular: Negative.    Gastrointestinal:  Negative for abdominal distention and abdominal pain.   Endocrine: Negative.    Genitourinary:  Negative for pelvic pain and vaginal bleeding.   Musculoskeletal:  Positive for gait problem.   Skin: Negative.    Allergic/Immunologic: Negative.    Hematological: Negative.    Psychiatric/Behavioral: Negative.      A complete review of systems is negative other than that noted above in the HPI.  Current Outpatient Medications on File Prior to Visit   Medication Sig Dispense Refill    albuterol (PROVENTIL HFA,VENTOLIN HFA) 90 mcg/act inhaler as needed .      allopurinol (ZYLOPRIM) 100 mg tablet Take 100 mg by mouth 2 (two) times a day       atorvastatin (LIPITOR) 20 mg tablet Take 20 mg by mouth daily at bedtime      carvedilol (COREG) 3.125 mg tablet Take 3.125 mg by mouth 2 (two) times a day      Cholecalciferol 25 MCG (1000 UT) tablet Take 1 tablet by mouth daily      citalopram (CeleXA) 10 mg tablet Take 10 mg by mouth daily      divalproex sodium (DEPAKOTE ER) 500 mg 24 hr tablet Take 500 mg by mouth daily at bedtime 2 tabs at bedtime      folic acid (FOLVITE) 1 mg tablet Take 1,000 mcg by mouth daily      furosemide (LASIX) 40 mg tablet Take 40 mg by mouth daily      gabapentin (NEURONTIN) 600 MG tablet Take 600 mg by mouth 3 (three) times a day 3-4 times a day      K Phos [place]-Sod Phos Di & Mono (Phospha 250 Neutral) 155-852-130 MG TABS Take 1 tablet by mouth 2 (two) times a day      metFORMIN (GLUCOPHAGE) 500 mg tablet Take 500 mg by mouth 3 (three) times a day with meals      Mounjaro 5 MG/0.5ML every 7 days On Fridays      nystatin powder apply as directed External Once a Day as Needed for 30 days      omeprazole (PriLOSEC) 20 mg delayed release capsule Take 20 mg by mouth daily      oxygen gas Inhale 4 L/min continuous Uses 4 L      Potassium Chloride ER 20 MEQ TBCR Take 1 tablet by mouth 2 (two) times a day with meals      QUEtiapine (SEROquel) 100 mg tablet Take 100 mg by mouth daily at bedtime      amLODIPine (NORVASC) 2.5 mg tablet Take 2.5 mg by mouth daily (Patient not taking: Reported on 3/12/2024)      escitalopram (LEXAPRO) 20 mg tablet Take 20 mg by mouth daily (Patient not taking: Reported on 3/12/2024)      fluticasone (FLONASE) 50 mcg/act nasal spray fluticasone propionate 0.05 MG/ACTUAT Metered Dose Nasal Spray  active   VIVIAN BLOUNT      Mounjaro 2.5 MG/0.5ML Inject 2.5 mg Subcutaneous weekly (Patient not taking: Reported on 3/12/2024)      nitroglycerin (NITROSTAT) 0.4 mg SL tablet PLACE 1 TABLET UNDER TONGUE EVERY 5 MINS, UP TO 3 DOSES AS NEEDED FOR CHEST PAIN Sublingual for 30 Days       No current  facility-administered medications on file prior to visit.         Objective   /60 (BP Location: Left arm, Patient Position: Sitting, Cuff Size: Large)   Pulse 89   Temp (!) 97.4 °F (36.3 °C) (Temporal)   Resp 18   Ht 5' (1.524 m)   Wt 88.3 kg (194 lb 9.6 oz)   SpO2 95%   BMI 38.01 kg/m²     Body mass index is 38.01 kg/m².  Pain Screening:  Pain Score: 0-No pain  ECOG   2  Physical Exam  Vitals reviewed. Exam conducted with a chaperone present.   Constitutional:       General: She is not in acute distress.     Appearance: Normal appearance. She is well-developed. She is obese. She is not ill-appearing, toxic-appearing or diaphoretic.   HENT:      Head: Normocephalic and atraumatic.   Eyes:      General: No scleral icterus.     Extraocular Movements: Extraocular movements intact.      Conjunctiva/sclera: Conjunctivae normal.   Neck:      Thyroid: No thyromegaly.   Pulmonary:      Effort: Pulmonary effort is normal.   Abdominal:      General: There is no distension.      Palpations: Abdomen is soft. There is no mass.      Tenderness: There is no abdominal tenderness. There is no guarding or rebound.   Genitourinary:     Comments: The external female genitalia is normal. The bartholin's, uretheral and skenes glands are normal. The urethral meatus is normal (midline with no lesions). Anus without fissure or lesion. Speculum exam reveals a grossly normal vagina. No masses, lesions,discharge or bleeding. No significant cystocele or rectocele noted. Bimanual exam notes a surgical absent cervix, uterus and adnexal structures.  There is a mass past the vaginal apex which is likely retained stool.  It is mobile. Bladder is without fullness, mass or tenderness.    Musculoskeletal:         General: No swelling or tenderness.      Cervical back: Normal range of motion and neck supple.      Right lower leg: Edema present.      Left lower leg: Edema present.   Lymphadenopathy:      Cervical: No cervical adenopathy.    Skin:     General: Skin is warm and dry.      Coloration: Skin is not jaundiced or pale.      Findings: No lesion or rash.   Neurological:      General: No focal deficit present.      Mental Status: She is alert and oriented to person, place, and time. Mental status is at baseline.      Cranial Nerves: No cranial nerve deficit.      Motor: No weakness.      Gait: Gait normal.   Psychiatric:         Mood and Affect: Mood normal.         Behavior: Behavior normal.         Thought Content: Thought content normal.         Judgment: Judgment normal.

## 2025-01-09 NOTE — ASSESSMENT & PLAN NOTE
69-year-old with stage Ib grade 1 endometrial cancer, COPD on chronic oxygen therapy, MRD testing has been negative x 6 most recently 12/9/2024.  She is clinically without evidence of disease recurrence.  Her performance status is 2.  1.  Follow-up CT imaging of chest abdomen pelvis prior to next visit.  2.  Return in 3 months for endometrial cancer surveillance  3.  Continue MRD testing.  Orders:    BUN; Future    Creatinine, serum; Future    CT chest abdomen pelvis w contrast; Future

## 2025-01-13 ENCOUNTER — TELEPHONE (OUTPATIENT)
Age: 70
End: 2025-01-13

## 2025-01-13 NOTE — TELEPHONE ENCOUNTER
Pt scheduled her CT CHEST ABDOMEN PELVIS W CONTRAST at [facility] 1/24/25 1:45. Pt's son said they would send prior authorization

## 2025-01-14 NOTE — TELEPHONE ENCOUNTER
Pt's son calling back with the address for the location of the hospital where she will be getting the CT scan [place] [address]  Ph: [phone]   Scheduled for 1/24 at [id number]

## 2025-01-31 ENCOUNTER — TELEPHONE (OUTPATIENT)
Age: 70
End: 2025-01-31

## 2025-01-31 NOTE — TELEPHONE ENCOUNTER
Called and spoke with patient's son, Vikas.  Vikas stated that patient has not had lab work done for CT scan and will not be able to have this completed today.  Instructed Vikas to contact [facility] radiology department and make them aware, as most likely, CT scan will need to be rescheduled.  Vikas requested lab orders fax to [facility].  Will fax lab orders to [facility].

## 2025-01-31 NOTE — TELEPHONE ENCOUNTER
Li, CT Tech at [facility], calling in stating Deloris does not have up to date labs for her CT scan that is scheduled on Monday 2/3/25 @ 5PM. Upon chart review there are lab orders that were placed for [facility] but not completed. Li is requesting that a call is placed to this patient to remind her to have her labs completed, otherwise the CT will have to be canceled.

## 2025-02-03 NOTE — TELEPHONE ENCOUNTER
Called and spoke with patient son to confirm which [facility] they use. Confirmed [id number] [facility]. Refaxed blood scripts to [facility]. Patient's son stated that  they have an appointment scheduled for blood work.  Told him to inform [facility] where the blood work results should be faxed at the appointment.  Provided Patient's son with [employer] phone number to set up [employer].

## 2025-02-11 ENCOUNTER — HOSPITAL ENCOUNTER (OUTPATIENT)
Dept: HOSPITAL 99 - RAD | Age: 70
End: 2025-02-11
Payer: SELF-PAY

## 2025-02-11 DIAGNOSIS — C54.1: Primary | ICD-10-CM

## 2025-02-11 DIAGNOSIS — R91.8: ICD-10-CM

## 2025-03-23 ENCOUNTER — HOSPITAL ENCOUNTER (INPATIENT)
Dept: HOSPITAL 99 - 4 EAST ACU | Age: 70
LOS: 3 days | Discharge: HOME HEALTH | DRG: 189 | End: 2025-03-26
Payer: SELF-PAY

## 2025-03-23 VITALS — BODY MASS INDEX: 35.6 KG/M2 | BODY MASS INDEX: 38.2 KG/M2 | BODY MASS INDEX: 35.5 KG/M2 | BODY MASS INDEX: 35.7 KG/M2

## 2025-03-23 VITALS — RESPIRATION RATE: 19 BRPM

## 2025-03-23 VITALS — RESPIRATION RATE: 28 BRPM

## 2025-03-23 VITALS — RESPIRATION RATE: 14 BRPM | DIASTOLIC BLOOD PRESSURE: 42 MMHG | SYSTOLIC BLOOD PRESSURE: 104 MMHG

## 2025-03-23 VITALS — SYSTOLIC BLOOD PRESSURE: 107 MMHG | DIASTOLIC BLOOD PRESSURE: 40 MMHG | RESPIRATION RATE: 21 BRPM

## 2025-03-23 VITALS — SYSTOLIC BLOOD PRESSURE: 154 MMHG | DIASTOLIC BLOOD PRESSURE: 64 MMHG

## 2025-03-23 VITALS — RESPIRATION RATE: 16 BRPM | SYSTOLIC BLOOD PRESSURE: 110 MMHG | DIASTOLIC BLOOD PRESSURE: 47 MMHG

## 2025-03-23 VITALS — DIASTOLIC BLOOD PRESSURE: 50 MMHG | SYSTOLIC BLOOD PRESSURE: 120 MMHG | RESPIRATION RATE: 17 BRPM

## 2025-03-23 VITALS — RESPIRATION RATE: 12 BRPM

## 2025-03-23 VITALS — RESPIRATION RATE: 18 BRPM

## 2025-03-23 VITALS — RESPIRATION RATE: 30 BRPM

## 2025-03-23 VITALS — DIASTOLIC BLOOD PRESSURE: 52 MMHG | SYSTOLIC BLOOD PRESSURE: 132 MMHG | RESPIRATION RATE: 17 BRPM

## 2025-03-23 VITALS — RESPIRATION RATE: 25 BRPM

## 2025-03-23 VITALS — SYSTOLIC BLOOD PRESSURE: 102 MMHG | DIASTOLIC BLOOD PRESSURE: 44 MMHG | RESPIRATION RATE: 25 BRPM

## 2025-03-23 VITALS — DIASTOLIC BLOOD PRESSURE: 40 MMHG | RESPIRATION RATE: 23 BRPM | SYSTOLIC BLOOD PRESSURE: 123 MMHG

## 2025-03-23 VITALS — RESPIRATION RATE: 34 BRPM

## 2025-03-23 VITALS — DIASTOLIC BLOOD PRESSURE: 50 MMHG | SYSTOLIC BLOOD PRESSURE: 119 MMHG | RESPIRATION RATE: 24 BRPM

## 2025-03-23 VITALS — SYSTOLIC BLOOD PRESSURE: 116 MMHG | DIASTOLIC BLOOD PRESSURE: 57 MMHG | RESPIRATION RATE: 20 BRPM

## 2025-03-23 VITALS — RESPIRATION RATE: 21 BRPM

## 2025-03-23 VITALS — HEART RATE: 85 BPM

## 2025-03-23 VITALS — DIASTOLIC BLOOD PRESSURE: 91 MMHG | SYSTOLIC BLOOD PRESSURE: 131 MMHG

## 2025-03-23 VITALS — RESPIRATION RATE: 22 BRPM

## 2025-03-23 VITALS — RESPIRATION RATE: 13 BRPM

## 2025-03-23 VITALS — RESPIRATION RATE: 15 BRPM

## 2025-03-23 VITALS — RESPIRATION RATE: 24 BRPM

## 2025-03-23 VITALS — RESPIRATION RATE: 16 BRPM

## 2025-03-23 DIAGNOSIS — E11.9: ICD-10-CM
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
## 2025-03-23 DIAGNOSIS — E66.2: ICD-10-CM

## 2025-03-23 DIAGNOSIS — J96.12: Primary | ICD-10-CM

## 2025-03-23 DIAGNOSIS — J44.9: ICD-10-CM

## 2025-03-23 DIAGNOSIS — I44.7: ICD-10-CM

## 2025-03-23 DIAGNOSIS — S39.012A: ICD-10-CM

## 2025-03-23 DIAGNOSIS — E78.00: ICD-10-CM

## 2025-03-23 DIAGNOSIS — D69.6: ICD-10-CM

## 2025-03-23 DIAGNOSIS — E87.29: ICD-10-CM

## 2025-03-23 DIAGNOSIS — N17.9: ICD-10-CM

## 2025-03-23 DIAGNOSIS — I10: ICD-10-CM

## 2025-03-23 DIAGNOSIS — D53.9: ICD-10-CM

## 2025-03-23 DIAGNOSIS — Z23: ICD-10-CM

## 2025-03-23 DIAGNOSIS — Z90.710: ICD-10-CM

## 2025-03-23 LAB
ALBUMIN SERPL-MCNC: 3.5 G/DL (ref 3.5–5)
ALP SERPL-CCNC: 121 U/L (ref 38–126)
ALT SERPL-CCNC: 21 U/L (ref 0–35)
AST SERPL-CCNC: 42 U/L (ref 14–36)
BUN SERPL-MCNC: 13 MG/DL (ref 7–17)
CALCIUM SERPL-MCNC: 9.2 MG/DL (ref 8.4–10.2)
CHLORIDE SERPL-SCNC: 102 MMOL/L (ref 98–107)
CO2 SERPL-SCNC: 29 MMOL/L (ref 22–30)
EGFR: 39.97
ERYTHROCYTE [DISTWIDTH] IN BLOOD BY AUTOMATED COUNT: 14.7 % (ref 11.5–14.5)
ESTIMATED CREATININE CLEARANCE: 36 ML/MIN
GLUCOSE SERPL-MCNC: 136 MG/DL (ref 70–99)
HCT VFR BLD AUTO: 28.9 % (ref 37–47)
HGB BLD-MCNC: 9.5 G/DL (ref 12–16)
MCHC RBC AUTO-ENTMCNC: 32.9 G/DL (ref 33–37)
MCV RBC AUTO: 99 FL (ref 81–99)
NRBC BLD AUTO-RTO: 0 %
PLATELET # BLD AUTO: 91 10^3/UL (ref 130–400)
POTASSIUM SERPL-SCNC: 4.3 MMOL/L (ref 3.5–5.1)
PROT SERPL-MCNC: 5.9 G/DL (ref 6.3–8.2)
SODIUM SERPL-SCNC: 139 MMOL/L (ref 135–145)
SQUAMOUS URNS QL MICRO: >30 /LPF
TROPONIN I SERPL-MCNC: 0.02 NG/ML
URINE RED BLOOD CELL: (no result) /HPF (ref 0–2)
URINE WHITE CELL: (no result) /HPF (ref 0–5)
VENOUS BLOOD GAS B.E.: 2.4 MMOL/L
VENOUS BLOOD GAS O2 SAT %: 94.2 %

## 2025-03-23 PROCEDURE — G0008 ADMIN INFLUENZA VIRUS VAC: HCPCS

## 2025-03-23 PROCEDURE — 5A09357 ASSISTANCE WITH RESPIRATORY VENTILATION, LESS THAN 24 CONSECUTIVE HOURS, CONTINUOUS POSITIVE AIRWAY PRESSURE: ICD-10-PCS | Performed by: HOSPITALIST

## 2025-03-23 RX ADMIN — GABAPENTIN 600 MG: 300 CAPSULE ORAL at 23:46

## 2025-03-23 RX ADMIN — Medication 250 MG: at 23:46

## 2025-03-23 RX ADMIN — ALLOPURINOL 100 MG: 100 TABLET ORAL at 23:46

## 2025-03-23 RX ADMIN — SODIUM CHLORIDE 1000: 900 INJECTION, SOLUTION INTRAVENOUS at 23:10

## 2025-03-23 RX ADMIN — CARVEDILOL 3.12 MG: 3.12 TABLET, FILM COATED ORAL at 23:45

## 2025-03-24 VITALS — RESPIRATION RATE: 20 BRPM | DIASTOLIC BLOOD PRESSURE: 57 MMHG | SYSTOLIC BLOOD PRESSURE: 136 MMHG

## 2025-03-24 VITALS — SYSTOLIC BLOOD PRESSURE: 91 MMHG | DIASTOLIC BLOOD PRESSURE: 60 MMHG

## 2025-03-24 VITALS — DIASTOLIC BLOOD PRESSURE: 71 MMHG | RESPIRATION RATE: 20 BRPM | SYSTOLIC BLOOD PRESSURE: 154 MMHG

## 2025-03-24 VITALS — SYSTOLIC BLOOD PRESSURE: 143 MMHG | DIASTOLIC BLOOD PRESSURE: 61 MMHG | RESPIRATION RATE: 18 BRPM

## 2025-03-24 VITALS — RESPIRATION RATE: 17 BRPM | SYSTOLIC BLOOD PRESSURE: 136 MMHG | DIASTOLIC BLOOD PRESSURE: 57 MMHG

## 2025-03-24 VITALS — RESPIRATION RATE: 18 BRPM | DIASTOLIC BLOOD PRESSURE: 54 MMHG | SYSTOLIC BLOOD PRESSURE: 118 MMHG

## 2025-03-24 VITALS — DIASTOLIC BLOOD PRESSURE: 46 MMHG | SYSTOLIC BLOOD PRESSURE: 103 MMHG

## 2025-03-24 VITALS — HEART RATE: 2 BPM

## 2025-03-24 VITALS — RESPIRATION RATE: 20 BRPM | SYSTOLIC BLOOD PRESSURE: 94 MMHG | DIASTOLIC BLOOD PRESSURE: 72 MMHG

## 2025-03-24 VITALS — RESPIRATION RATE: 18 BRPM

## 2025-03-24 LAB
ALBUMIN SERPL-MCNC: 3 G/DL (ref 3.5–5)
ALP SERPL-CCNC: 108 U/L (ref 38–126)
ALT SERPL-CCNC: 15 U/L (ref 0–35)
AST SERPL-CCNC: 28 U/L (ref 14–36)
BUN SERPL-MCNC: 14 MG/DL (ref 7–17)
CALCIUM SERPL-MCNC: 8.6 MG/DL (ref 8.4–10.2)
CHLORIDE SERPL-SCNC: 106 MMOL/L (ref 98–107)
CO2 SERPL-SCNC: 29 MMOL/L (ref 22–30)
EGFR: 43.69
ERYTHROCYTE [DISTWIDTH] IN BLOOD BY AUTOMATED COUNT: 14.6 % (ref 11.5–14.5)
ESTIMATED CREATININE CLEARANCE: 37 ML/MIN
GLUCOSE SERPL-MCNC: 102 MG/DL (ref 70–99)
HCT VFR BLD AUTO: 27.1 % (ref 37–47)
HGB BLD-MCNC: 9.1 G/DL (ref 12–16)
MCHC RBC AUTO-ENTMCNC: 33.6 G/DL (ref 33–37)
MCV RBC AUTO: 97.1 FL (ref 81–99)
NRBC BLD AUTO-RTO: 0 %
PLATELET # BLD AUTO: 82 10^3/UL (ref 130–400)
POTASSIUM SERPL-SCNC: 3.6 MMOL/L (ref 3.5–5.1)
PROT SERPL-MCNC: 5.2 G/DL (ref 6.3–8.2)
SODIUM SERPL-SCNC: 141 MMOL/L (ref 135–145)

## 2025-03-24 RX ADMIN — ALLOPURINOL 100 MG: 100 TABLET ORAL at 09:18

## 2025-03-24 RX ADMIN — ALLOPURINOL 100 MG: 100 TABLET ORAL at 19:58

## 2025-03-24 RX ADMIN — GABAPENTIN 600 MG: 300 CAPSULE ORAL at 21:12

## 2025-03-24 RX ADMIN — POTASSIUM CHLORIDE 20 MEQ: 1500 TABLET, EXTENDED RELEASE ORAL at 09:18

## 2025-03-24 RX ADMIN — POLYVINYL ALCOHOL, POVIDONE 1 DROPS: 14; 6 SOLUTION/ DROPS OPHTHALMIC at 13:36

## 2025-03-24 RX ADMIN — ACETAMINOPHEN 1000 MG: 500 TABLET ORAL at 17:59

## 2025-03-24 RX ADMIN — Medication 250 MG: at 19:58

## 2025-03-24 RX ADMIN — ACETAMINOPHEN 1000 MG: 500 TABLET ORAL at 21:12

## 2025-03-24 RX ADMIN — CARVEDILOL 3.12 MG: 3.12 TABLET, FILM COATED ORAL at 09:19

## 2025-03-24 RX ADMIN — SODIUM CHLORIDE 1000: 900 INJECTION, SOLUTION INTRAVENOUS at 12:04

## 2025-03-24 RX ADMIN — Medication 250 MG: at 09:19

## 2025-03-24 RX ADMIN — ACETAMINOPHEN 650 MG: 325 TABLET ORAL at 13:04

## 2025-03-24 RX ADMIN — QUETIAPINE FUMARATE 100 MG: 100 TABLET ORAL at 09:18

## 2025-03-24 RX ADMIN — CARVEDILOL 3.12 MG: 3.12 TABLET, FILM COATED ORAL at 19:57

## 2025-03-24 RX ADMIN — FUROSEMIDE 40 MG: 40 TABLET ORAL at 09:17

## 2025-03-24 RX ADMIN — CYCLOBENZAPRINE HYDROCHLORIDE 5 MG: 10 TABLET, FILM COATED ORAL at 13:04

## 2025-03-24 RX ADMIN — LIDOCAINE 1 PATCH: 4 PATCH TOPICAL at 09:19

## 2025-03-24 RX ADMIN — POLYVINYL ALCOHOL, POVIDONE 1 DROPS: 14; 6 SOLUTION/ DROPS OPHTHALMIC at 17:59

## 2025-03-24 RX ADMIN — CITALOPRAM HYDROBROMIDE 10 MG: 10 TABLET ORAL at 09:17

## 2025-03-24 RX ADMIN — Medication 25 MCG: at 09:18

## 2025-03-24 RX ADMIN — GABAPENTIN 600 MG: 300 CAPSULE ORAL at 09:18

## 2025-03-24 RX ADMIN — PANTOPRAZOLE SODIUM 40 MG: 40 TABLET, DELAYED RELEASE ORAL at 09:18

## 2025-03-24 RX ADMIN — POTASSIUM CHLORIDE 20 MEQ: 1500 TABLET, EXTENDED RELEASE ORAL at 19:58

## 2025-03-24 RX ADMIN — GABAPENTIN 600 MG: 300 CAPSULE ORAL at 16:38

## 2025-03-24 RX ADMIN — ATORVASTATIN CALCIUM 20 MG: 20 TABLET, FILM COATED ORAL at 09:18

## 2025-03-24 RX ADMIN — FOLIC ACID 1 MG: 1 TABLET ORAL at 09:19

## 2025-03-24 RX ADMIN — DIVALPROEX SODIUM 1000 MG: 500 TABLET, FILM COATED, EXTENDED RELEASE ORAL at 09:17

## 2025-03-24 NOTE — CM
"Patient seen at bedside. Patient stated that she lives in a trailer with 4 steps to enter. Patient has a walker and a cane. Patient Lives with her 2 sons. Patient had home O2, and CPAP. Patient PDP is Dr. Toussaint and she uses [facility] on [address]. "~"Patient would like to go home with VN, watch for PT assessment. Patient uncertain of her O2 provider. Patient son states that the O2 is from [facility] and she has a CPAP as well as a concentrator 5 liters. CM will continue to follow for "~"discharge planning needs."~"Plan; home with VN vs SNF; watch for home O2 increased needs/Bipap changes."

## 2025-03-24 NOTE — W.PN.HOSP.TC
"Today's Communication/Plan"~"-"~"-: "~"see outlined plan"~"Assessment / Plan"~"Assessment / Plan"~"-: "~"Assessment: "~"Acute lower back pain likely muscle strain"~"- CT abdomen pelvis does not show any abnormality"~"- continue Tylenol, ibuprofen, gabapentin"~"- continue lidocaine patch"~"- continue Flexeril prn"~"- would avoid Narcotics due to hypercarbia"~"Lethargy/syncopal episodes concerning for intermittent hypercarbia likely due to worsening COPD/obstructive sleep apnea"~"- Has been awake and alert here"~"- Urinalysis unremarkable"~"- Chest x-ray unremarkable"~"- VBG shows pH of 7.33, pCO2 of 55, pO2 of 72, bicarb of 29 indicating chronic compensated hypercarbic respiratory failure. continue BiPAP/CPAP at night"~"- outpatient pulmonary follow up"~"Headache/dizziness possibly secondary to hypercarbia"~"- check orthostatic vital signs"~"- PT/OT"~"COPD on 4 L baseline "~"- titrate O2 to 90-94% sats"~"Obstructive sleep apnea"~"- continue BiPAP/CPAP at night"~"Acute kidney injury versus CKD"~"- Creatinine 1.4, no prior for comparison"~"- Gentle IV fluids"~"Thrombocytopenia"~"- Unknown chronicity"~"- Platelets of 91"~"Macrocytic anemia"~"- hemoglobin 9.5"~"History of epistaxis"~"DVT ppx: SC heparin"~"Code: Full"~"Anticipated Discharge: 24 - 48 hours"~"Subjective/Interval History"~"-"~"-: "~"Date of Service:  March 24, 2025"~"denies any new complaints at present"~"Objective Data"~"-"~"Labs: "~"Laboratory Results"~" 	03/24/25"~" 	06:42"~"WBC	 3.9 L"~"Hgb	 9.1 L"~"Hct	 27.1 L"~"Plt Count	 82 L"~"Sodium	 141"~"Potassium	 3.6"~"Chloride	 106"~"Carbon Dioxide	 29"~"BUN	 14"~"Creatinine	 1.3 H"~"Glucose	 102 H"~"Calcium	 8.6"~"Total Bilirubin	 0.6"~"AST	 28"~"ALT	 15"~"Alkaline Phosphatase	 108"~"Vital Signs: "~"Vital Signs"~"Temp	Pulse	Resp	BP	Pulse Ox"~" 98.7 F 	 78 	 18 	 94/72 	 93 "~" 03/24/25 11:07	 03/24/25 14:37	 03/24/25 14:37	 03/24/25 11:07	 03/24/25 14:37"~"I&O"~"	03/23/25	03/24/25	03/25/25"~"	06:59	06:59	06:59"~"Intake Total		1040 / 1040	"~"Balance		1040 / 1040	"~"Physical Exam"~"-"~"General: No Apparent Distress"~"HEENT: Normocephalic and Atraumatic"~"Respiratory: Negative Wheezes"~"Cardiac: Regular Rhythm and S1/S2"~"GI: Soft"~"Genito-urinary: No Costovertebral Tender"~"Neuro: AO x 3"~"Psych: Calm"~"Data Reviewed"~"-"~"Total Time Spent with Patient (in minutes): 41"~"Labs: Labs Reviewed by me"

## 2025-03-25 VITALS — SYSTOLIC BLOOD PRESSURE: 117 MMHG | RESPIRATION RATE: 18 BRPM | DIASTOLIC BLOOD PRESSURE: 63 MMHG

## 2025-03-25 VITALS — SYSTOLIC BLOOD PRESSURE: 132 MMHG | DIASTOLIC BLOOD PRESSURE: 56 MMHG | RESPIRATION RATE: 18 BRPM

## 2025-03-25 VITALS — RESPIRATION RATE: 20 BRPM | SYSTOLIC BLOOD PRESSURE: 124 MMHG | DIASTOLIC BLOOD PRESSURE: 48 MMHG

## 2025-03-25 VITALS — HEART RATE: 77 BPM

## 2025-03-25 VITALS — SYSTOLIC BLOOD PRESSURE: 140 MMHG | RESPIRATION RATE: 20 BRPM | DIASTOLIC BLOOD PRESSURE: 58 MMHG

## 2025-03-25 VITALS — RESPIRATION RATE: 18 BRPM | DIASTOLIC BLOOD PRESSURE: 67 MMHG | SYSTOLIC BLOOD PRESSURE: 163 MMHG

## 2025-03-25 VITALS — HEART RATE: 74 BPM

## 2025-03-25 VITALS — SYSTOLIC BLOOD PRESSURE: 121 MMHG | DIASTOLIC BLOOD PRESSURE: 53 MMHG | RESPIRATION RATE: 18 BRPM

## 2025-03-25 LAB
BUN SERPL-MCNC: 15 MG/DL (ref 7–17)
CALCIUM SERPL-MCNC: 8.7 MG/DL (ref 8.4–10.2)
CHLORIDE SERPL-SCNC: 111 MMOL/L (ref 98–107)
CO2 SERPL-SCNC: 26 MMOL/L (ref 22–30)
EGFR: 43.69
ERYTHROCYTE [DISTWIDTH] IN BLOOD BY AUTOMATED COUNT: 14.6 % (ref 11.5–14.5)
ESTIMATED CREATININE CLEARANCE: 37 ML/MIN
GLUCOSE SERPL-MCNC: 117 MG/DL (ref 70–99)
HCT VFR BLD AUTO: 27.2 % (ref 37–47)
HGB BLD-MCNC: 9 G/DL (ref 12–16)
MCHC RBC AUTO-ENTMCNC: 33.1 G/DL (ref 33–37)
MCV RBC AUTO: 98.2 FL (ref 81–99)
PLATELET # BLD AUTO: 86 10^3/UL (ref 130–400)
POTASSIUM SERPL-SCNC: 4.2 MMOL/L (ref 3.5–5.1)
SODIUM SERPL-SCNC: 145 MMOL/L (ref 135–145)

## 2025-03-25 RX ADMIN — ACETAMINOPHEN 650 MG: 325 TABLET ORAL at 14:48

## 2025-03-25 RX ADMIN — Medication 250 MG: at 09:43

## 2025-03-25 RX ADMIN — CARVEDILOL 3.12 MG: 3.12 TABLET, FILM COATED ORAL at 09:45

## 2025-03-25 RX ADMIN — PANTOPRAZOLE SODIUM 40 MG: 40 TABLET, DELAYED RELEASE ORAL at 09:46

## 2025-03-25 RX ADMIN — CYCLOBENZAPRINE HYDROCHLORIDE 5 MG: 10 TABLET, FILM COATED ORAL at 21:30

## 2025-03-25 RX ADMIN — POTASSIUM CHLORIDE 20 MEQ: 1500 TABLET, EXTENDED RELEASE ORAL at 09:44

## 2025-03-25 RX ADMIN — GABAPENTIN 600 MG: 300 CAPSULE ORAL at 09:44

## 2025-03-25 RX ADMIN — CARVEDILOL 3.12 MG: 3.12 TABLET, FILM COATED ORAL at 20:38

## 2025-03-25 RX ADMIN — GABAPENTIN 300 MG: 300 CAPSULE ORAL at 21:30

## 2025-03-25 RX ADMIN — DIVALPROEX SODIUM 1000 MG: 500 TABLET, FILM COATED, EXTENDED RELEASE ORAL at 09:43

## 2025-03-25 RX ADMIN — FOLIC ACID 1 MG: 1 TABLET ORAL at 09:44

## 2025-03-25 RX ADMIN — LIDOCAINE 1 PATCH: 4 PATCH TOPICAL at 09:42

## 2025-03-25 RX ADMIN — Medication 250 MG: at 20:38

## 2025-03-25 RX ADMIN — ACETAMINOPHEN 1000 MG: 500 TABLET ORAL at 09:43

## 2025-03-25 RX ADMIN — ACETAMINOPHEN 1000 MG: 500 TABLET ORAL at 18:27

## 2025-03-25 RX ADMIN — POTASSIUM CHLORIDE 20 MEQ: 1500 TABLET, EXTENDED RELEASE ORAL at 20:38

## 2025-03-25 RX ADMIN — QUETIAPINE FUMARATE 100 MG: 100 TABLET ORAL at 09:44

## 2025-03-25 RX ADMIN — ALLOPURINOL 100 MG: 100 TABLET ORAL at 09:44

## 2025-03-25 RX ADMIN — SODIUM CHLORIDE 1000: 900 INJECTION, SOLUTION INTRAVENOUS at 00:59

## 2025-03-25 RX ADMIN — GABAPENTIN 300 MG: 300 CAPSULE ORAL at 18:27

## 2025-03-25 RX ADMIN — ACETAMINOPHEN 1000 MG: 500 TABLET ORAL at 21:30

## 2025-03-25 RX ADMIN — ALLOPURINOL 100 MG: 100 TABLET ORAL at 20:38

## 2025-03-25 RX ADMIN — FUROSEMIDE 40 MG: 40 TABLET ORAL at 09:45

## 2025-03-25 RX ADMIN — ANTI-FUNGAL POWDER MICONAZOLE NITRATE TALC FREE 1 APPLIC: 1.42 POWDER TOPICAL at 09:45

## 2025-03-25 RX ADMIN — Medication 25 MCG: at 09:44

## 2025-03-25 RX ADMIN — CITALOPRAM HYDROBROMIDE 10 MG: 10 TABLET ORAL at 09:44

## 2025-03-25 RX ADMIN — ATORVASTATIN CALCIUM 20 MG: 20 TABLET, FILM COATED ORAL at 09:44

## 2025-03-25 NOTE — PN.CDI
"CDI"~"- -"~"CDI: "~"Physician Documentation Request"~"Admit Date: 03/23/25 19:16"~"Dear Doctor Manolo, "~"Please review the following and provide your response in the progress notes. "~"Clinical Indicators:  "~"	Pt admitted with  Lethargy/syncopal episodes concerning for intermittent hypercarbia likely due to worsening COPD/obstructive sleep apnea"~"	Documented in the record, ' Obstructive sleep apnea continue BiPAP/CPAP at night...'"~"	BMI 35.6"~"Laboratory Tests"~" 	03/23/25"~" 	14:27"~"VBG pCO2	 55 H"~"VBG pO2	 72 H"~"VBG HCO3	 29.0 H"~"If possible, please provide an associated diagnosis related the documented hypercarbia / BMI:"~"	Obesity with NATALIYA/OHS"~"	Obesity with NATALIYA only"~"	Other ( please specify)"~"	"~"	"~"Use of terms such as suspected, likely, concern for, or probable (associated with a specific diagnosis that is being evaluated, monitored, or treated as if it exists) are acceptable and can be coded in the inpatient setting, when documented at the "~"time of discharge. "~"Thank you, "~"Kena Ye RN "~"CDI Specialist"~"Tiger Text "~"Please use your independent medical judgment in providing your response."

## 2025-03-25 NOTE — CM
"Met with patient and family bedside. "~"PT recommending skilled rehab, patient and family declined due to a bad experience. "~"Patient lives with son Stan, other family member Bryon stays with patient during the day and assists. "~"Patient and family agreeable to VN."~"Patient and family aware of probable d/c home tomorrow, Bryon will transport- please contact at [phone]. "~"Family will supply oxygen. "~"IMM reviewed and signed. "~"Plan: home with [facility]"

## 2025-03-25 NOTE — W.PN.HOSP.TC
"Today's Communication/Plan"~"-"~"-: "~"Flexeril to HS"~"continue HS and prn BiPAP"~"VN consult"~"DC planning"~"Assessment / Plan"~"Assessment / Plan"~"-: "~"Assessment: "~"Acute lower back pain likely muscle strain"~"- CT abdomen pelvis does not show any abnormality"~"- continue Tylenol, ibuprofen, gabapentin"~"- continue lidocaine patch"~"- continue Flexeril hs"~"- would avoid Narcotics due to hypercarbia"~"Lethargy/syncopal episodes concerning for intermittent hypercarbia likely due to worsening COPD/obstructive sleep apnea"~"- Has been awake and alert here"~"- Urinalysis unremarkable"~"- Chest x-ray unremarkable"~"- VBG shows pH of 7.33, pCO2 of 55, pO2 of 72, bicarb of 29 indicating chronic compensated hypercarbic respiratory failure. continue BiPAP/CPAP at night"~"- outpatient pulmonary follow up"~"Headache/dizziness possibly secondary to hypercarbia"~"- check orthostatic vital signs"~"- PT/OT - SNF recommended, but patient refuses. Accepts home/VN."~"COPD on 4 L baseline "~"- titrate O2 to 90-94% sats"~"Obstructive sleep apnea"~"- continue BiPAP/CPAP at night"~"Acute kidney injury versus CKD"~"- Creatinine 1.3, no prior for comparison"~"- cap IVF"~"Thrombocytopenia"~"- Unknown chronicity"~"- Platelets of 86"~"Macrocytic anemia"~"- hemoglobin 9.0"~"History of epistaxis"~"DVT ppx: SC heparin"~"Code: Full"~"Anticipated Discharge: Within 24 hours"~"Subjective/Interval History"~"-"~"-: "~"Date of Service:  March 25, 2025"~"reports significant improvement with Flexeril"~"used BiPAP all night, slept well no complaints"~"Objective Data"~"-"~"Labs: "~"Laboratory Results"~" 	03/25/25"~" 	06:30"~"WBC	 3.1 L"~"Hgb	 9.0 L"~"Hct	 27.2 L"~"Plt Count	 86 L"~"Sodium	 145"~"Potassium	 4.2"~"Chloride	 111 H"~"Carbon Dioxide	 26"~"BUN	 15"~"Creatinine	 1.3 H"~"Glucose	 117 H"~"Calcium	 8.7"~"Vital Signs: "~"Vital Signs"~"Temp	Pulse	Resp	BP	Pulse Ox"~" 97.7 F 	 69 	 20 	 140/58 	 97 "~" 03/25/25 07:35	 03/25/25 07:35	 03/25/25 07:35	 03/25/25 07:35	 03/25/25 07:35"~"I&O"~"	03/24/25	03/25/25	03/26/25"~"	06:59	06:59	06:59"~"Intake Total	1040 / 1040	1800 / 1800	"~"Balance	1040 / 1040	1800 / 1800	"~"Physical Exam"~"-"~"General: No Apparent Distress"~"HEENT: Normocephalic, Atraumatic and Deaf"~"Respiratory: Negative Wheezes"~"Cardiac: Regular Rhythm and S1/S2"~"GI: Soft"~"Genito-urinary: No Costovertebral Tender"~"Neuro: AO x 3"~"Hematologic / Lymphatic: No Lymphadenopathy"~"Psych: Calm"~"Data Reviewed"~"-"~"Total Time Spent with Patient (in minutes): 41"~"Labs: Labs Reviewed by me"

## 2025-03-25 NOTE — VNURNOTE
"Home health liaison met with patient and son Stan to discuss VN services, visit scheduling/frequency, homebound status and pet policy.  Patient understands home visits will be 1-2 times a week to assess and teach medical management. Patient "~"aware a visiting nurse will contact Stan for start of care within 1-2 days after discharge from . [facility] Referral completed in care port."

## 2025-03-26 VITALS — RESPIRATION RATE: 15 BRPM | SYSTOLIC BLOOD PRESSURE: 177 MMHG | DIASTOLIC BLOOD PRESSURE: 80 MMHG

## 2025-03-26 VITALS — HEART RATE: 2 BPM

## 2025-03-26 VITALS — DIASTOLIC BLOOD PRESSURE: 59 MMHG | SYSTOLIC BLOOD PRESSURE: 147 MMHG | RESPIRATION RATE: 16 BRPM

## 2025-03-26 VITALS — DIASTOLIC BLOOD PRESSURE: 66 MMHG | RESPIRATION RATE: 16 BRPM | SYSTOLIC BLOOD PRESSURE: 140 MMHG

## 2025-03-26 VITALS — DIASTOLIC BLOOD PRESSURE: 65 MMHG | RESPIRATION RATE: 16 BRPM | SYSTOLIC BLOOD PRESSURE: 119 MMHG

## 2025-03-26 PROCEDURE — 3E02340 INTRODUCTION OF INFLUENZA VACCINE INTO MUSCLE, PERCUTANEOUS APPROACH: ICD-10-PCS | Performed by: HOSPITALIST

## 2025-03-26 RX ADMIN — DIVALPROEX SODIUM 1000 MG: 500 TABLET, FILM COATED, EXTENDED RELEASE ORAL at 09:21

## 2025-03-26 RX ADMIN — ATORVASTATIN CALCIUM 20 MG: 20 TABLET, FILM COATED ORAL at 09:20

## 2025-03-26 RX ADMIN — FOLIC ACID 1 MG: 1 TABLET ORAL at 09:20

## 2025-03-26 RX ADMIN — Medication 25 MCG: at 09:19

## 2025-03-26 RX ADMIN — ALLOPURINOL 100 MG: 100 TABLET ORAL at 09:20

## 2025-03-26 RX ADMIN — ACETAMINOPHEN 1000 MG: 500 TABLET ORAL at 09:22

## 2025-03-26 RX ADMIN — Medication 250 MG: at 09:20

## 2025-03-26 RX ADMIN — QUETIAPINE FUMARATE 100 MG: 100 TABLET ORAL at 09:20

## 2025-03-26 RX ADMIN — GABAPENTIN: 300 CAPSULE ORAL at 17:02

## 2025-03-26 RX ADMIN — LIDOCAINE 1 PATCH: 4 PATCH TOPICAL at 09:18

## 2025-03-26 RX ADMIN — ACETAMINOPHEN: 500 TABLET ORAL at 17:02

## 2025-03-26 RX ADMIN — CITALOPRAM HYDROBROMIDE 10 MG: 10 TABLET ORAL at 09:20

## 2025-03-26 RX ADMIN — CARVEDILOL 3.12 MG: 3.12 TABLET, FILM COATED ORAL at 09:21

## 2025-03-26 RX ADMIN — GABAPENTIN 300 MG: 300 CAPSULE ORAL at 09:19

## 2025-03-26 RX ADMIN — INFLUENZA A VIRUS A/VICTORIA/4897/2022 IVR-238 (H1N1) ANTIGEN (FORMALDEHYDE INACTIVATED), INFLUENZA A VIRUS A/THAILAND/8/2022 IVR-237 (H3N2) ANTIGEN (FORMALDEHYDE INACTIVATED), INFLUENZA B VIRUS B/AUSTRIA/1359417/2021 BVR-26 ANTIGEN (FORMALDEHYDE INACTIVATED) 0.5 ML: 15; 15; 15 INJECTION, SUSPENSION INTRAMUSCULAR at 12:25

## 2025-03-26 RX ADMIN — POTASSIUM CHLORIDE 20 MEQ: 1500 TABLET, EXTENDED RELEASE ORAL at 09:20

## 2025-03-26 RX ADMIN — PANTOPRAZOLE SODIUM 40 MG: 40 TABLET, DELAYED RELEASE ORAL at 09:20

## 2025-03-26 RX ADMIN — FUROSEMIDE 40 MG: 40 TABLET ORAL at 09:19

## 2025-03-26 NOTE — W.PN.HOSP.TC
"Today's Communication/Plan"~"-"~"-: "~"dc to home/VN today"~"PCP f/u 1 week"~"Assessment / Plan"~"Assessment / Plan"~"-: "~"Assessment: "~"Acute lower back pain likely muscle strain"~"- CT abdomen pelvis does not show any abnormality"~"- continue Tylenol, ibuprofen, gabapentin"~"- continue lidocaine patch"~"- continue Flexeril hs"~"- would avoid Narcotics due to hypercarbia"~"Lethargy/syncopal episodes concerning for intermittent hypercarbia likely due to worsening COPD/obstructive sleep apnea"~"- Has been awake and alert here"~"- Urinalysis unremarkable"~"- Chest x-ray unremarkable"~"- VBG shows pH of 7.33, pCO2 of 55, pO2 of 72, bicarb of 29 indicating chronic compensated hypercarbic respiratory failure. continue BiPAP/CPAP at night"~"- outpatient pulmonary follow up"~"Headache/dizziness possibly secondary to hypercarbia"~"- PT/OT - SNF recommended, but patient refuses. Accepts home/VN."~"COPD on 4 L baseline "~"- titrate O2 to 90-94% sats"~"Obstructive sleep apnea/OHS in setting of obesity"~"- continue BiPAP/CPAP at night"~"Acute kidney injury versus CKD"~"- Creatinine 1.3, no prior for comparison"~"- cap IVF"~"Thrombocytopenia"~"Macrocytic anemia"~"- Unknown chronicity"~"- outpatient hematology eval"~"History of epistaxis"~"DVT ppx: SC heparin"~"Code: Full"~"More than 30 minutes spent in discharge including"~"	Final examination of the patient"~"	Summarizing hospital stay"~"	Instructions for continuing care to all relevant caregivers"~"	Preparation of discharge records, prescriptions, and referral forms"~"Total time spent (in minutes):  41  "~"Anticipated Discharge: Today"~"Subjective/Interval History"~"-"~"-: "~"Date of Service:  March 26, 2025"~"resting well, no complaints"~"Objective Data"~"-"~"Vital Signs: "~"Vital Signs"~"Temp	Pulse	Resp	BP	Pulse Ox"~" 97.4 F 	 72 	 15 	 177/80 	 94 "~" 03/26/25 03:00	 03/26/25 03:00	 03/26/25 03:00	 03/26/25 03:00	 03/26/25 03:00"~"I&O"~"	03/25/25	03/26/25	03/27/25"~"	06:59	06:59	06:59"~"Intake Total	1800 / 1800	720 / 720	"~"Balance	1800 / 1800	720 / 720	"~"Physical Exam"~"-"~"General: No Apparent Distress"~"HEENT: Normocephalic and Atraumatic"~"Respiratory: Negative Wheezes"~"Cardiac: Regular Rhythm and S1/S2"~"GI: Soft"~"Genito-urinary: No Costovertebral Tender"~"Neuro: AO x 3"~"Psych: Calm"~"Data Reviewed"~"-"~"Total Time Spent with Patient (in minutes): 42"~"Labs: Labs Reviewed by me"

## 2025-03-26 NOTE — W.DS.TRANS
"DC Summary - Transcription"~"-"~"Discharge Instructions: "~"Discharge Diagnosis/Procedures	acute back pain from pulled muscle              	"~"Diet                          	Regular                                         	"~"Activity                      	As tolerated                                    	"~"Bathing Restrictions          	None                                            	"~"Other Services                	VN                                              	"~"Instructions:       "~"Stand-Alone Forms:  "~"Changes to Home Medications: Yes"~"Discharge Medications: "~"DC Medications w/original date entered in [email or web address]"~"albuterol sulfate 2.5 mg/3 mL (0.083 %) solution for nebulization 2.5 mg inhalation R DAILYPRN PRN sob 03/23/25 "~"albuterol sulfate 90 mcg/actuation aerosol inhaler 2 inh inhalation R DAILYPRN PRN sob 03/23/25 "~"allopurinol 100 mg tablet 100 mg PO BID 03/23/25 "~"atorvastatin 20 mg tablet 20 mg PO DAILY 03/23/25 "~"carvedilol 3.125 mg tablet 3.125 mg PO BID 03/23/25 "~"cholecalciferol (vitamin D3) 25 mcg (1,000 unit) tablet 25 mcg PO DAILY 03/23/25 "~"citalopram 10 mg tablet 10 mg PO DAILY 03/23/25 "~"divalproex 500 mg tablet,extended release 24 hr 1,000 mg PO DAILY 03/23/25 "~"folic acid 1 mg tablet 1 mg PO DAILY 03/23/25 "~"furosemide 40 mg tablet 40 mg PO DAILY 03/23/25 "~"metformin 500 mg tablet 500 mg PO TID 03/23/25 "~"naproxen sodium 220 mg tablet (Aleve) 220 mg PO BID PRN mild pain 03/23/25 "~"nystatin 100,000 unit/gram topical powder 1 applic topical DAILYPRN PRN rash 03/23/25 "~"omeprazole 20 mg capsule,delayed release 20 mg PO DAILY 03/23/25 "~"polyvinyl alcohol 1.4 % eye drops (Artificial Tears (polyvinyl alcohol)) 1 drp ophthalmic (eye) DAILYPRN PRN dry eyes 03/23/25 "~"potassium chloride 20 mEq tablet,extended release 20 meq PO BID 03/23/25 "~"quetiapine 100 mg tablet 100 mg PO DAILY 03/23/25 "~"sodium chloride 0.65 % nasal spray aerosol (Saline Nasal) 1 spray intranasal DAILYPRN PRN dry sinuses 03/23/25 "~"sodium di- and monophosphate-potassium phos monobasic 250 mg tablet (K-Phos-Neutral) 1 tab PO BID 03/23/25 "~"tirzepatide 5 mg/0.5 mL subcutaneous pen injector (Mounjaro) 5 mg SC TH 03/23/25 "~"acetaminophen 325 mg tablet 650 mg (2 x 325 mg) PO Q4HPRN PRN mild pain/fever&gt;100.4 #100 tabs 03/26/25 "~"acetaminophen 500 mg tablet (Tylenol Extra Strength) 1,000 mg (2 x 500 mg) PO TID #100 tabs 03/26/25 "~"cyclobenzaprine 10 mg tablet 5 mg (1/2 x 10 mg) PO HS #14 tabs 03/26/25 "~"gabapentin 300 mg capsule 300 mg PO TID #90 caps 03/26/25 "~"lidocaine 4 % topical patch 1 patch topical DAILY #30 ea 03/26/25 "~"Home Medication Changes  "~"Gabapentin to TID 300mg"~"Pending Results: No"~"Total time spent discharging patient (in min): 41"

## 2025-03-26 NOTE — PTCARENOTE
Discharge instructions reviewed with patient and family. Answered all questions. Tele and IV removed. Patient belongings packed. Flu vaccine administered. Awaiting son to transport home. Son will bring oxygen to transport. Vital signs stable.

## 2025-03-28 ENCOUNTER — TELEPHONE (OUTPATIENT)
Age: 70
End: 2025-03-28

## 2025-03-28 NOTE — TELEPHONE ENCOUNTER
Can you please let him know that we will need to obtain records, but she has borderline low platelet count for several years. We would recommend hematology referral. Once records obtained, please send to me, I will review and place referral. Thanks!

## 2025-03-28 NOTE — TELEPHONE ENCOUNTER
Patient was admitted and released- [facility] last 3 days.  Son, Vikas calling to speak with Dr. Leonard he has some questions regarding her blood levels. He can be reached [phone]

## 2025-03-28 NOTE — TELEPHONE ENCOUNTER
Placed call to patient's son Vikas to get more information. He reports that patient pulled her back 3 weeks ago and was admitted at [facility] due to weakness. She was discharged home on Flexeril and has been doing well otherwise.     Vikas mentioned that her platelet level was 65,000 from admission to discharge and remained on that number. He was advised that the level is low, and is asking for Dr Leonard's advise. He specifically will like to know if this platelet level is something to be concerned about, or if this is patient's expected baseline since having her surgery. He denies that she is experiencing any bleeding problems.

## 2025-03-31 ENCOUNTER — HOSPITAL ENCOUNTER (OUTPATIENT)
Dept: HOSPITAL 99 - RCS | Age: 70
End: 2025-03-31
Payer: SELF-PAY

## 2025-03-31 DIAGNOSIS — I50.32: Primary | ICD-10-CM

## 2025-04-02 DIAGNOSIS — D69.6 THROMBOCYTOPENIA (HCC): Primary | ICD-10-CM

## 2025-04-02 NOTE — TELEPHONE ENCOUNTER
Records received and reviewed. Will place hematology referral. Please notify patient's son. Thanks!

## 2025-04-02 NOTE — TELEPHONE ENCOUNTER
Called and left a message that we received the records and Karla did put in a referral to Hematology.

## 2025-04-24 ENCOUNTER — OFFICE VISIT (OUTPATIENT)
Age: 70
End: 2025-04-24
Payer: COMMERCIAL

## 2025-04-24 VITALS
WEIGHT: 181.2 LBS | HEIGHT: 60 IN | BODY MASS INDEX: 35.57 KG/M2 | DIASTOLIC BLOOD PRESSURE: 62 MMHG | RESPIRATION RATE: 18 BRPM | SYSTOLIC BLOOD PRESSURE: 100 MMHG | TEMPERATURE: 98.1 F | HEART RATE: 84 BPM | OXYGEN SATURATION: 94 %

## 2025-04-24 DIAGNOSIS — D69.6 THROMBOCYTOPENIA (HCC): ICD-10-CM

## 2025-04-24 DIAGNOSIS — N18.32 ANEMIA DUE TO STAGE 3B CHRONIC KIDNEY DISEASE  (HCC): ICD-10-CM

## 2025-04-24 DIAGNOSIS — C54.1 ENDOMETRIAL CANCER (HCC): Primary | ICD-10-CM

## 2025-04-24 DIAGNOSIS — D63.1 ANEMIA DUE TO STAGE 3B CHRONIC KIDNEY DISEASE  (HCC): ICD-10-CM

## 2025-04-24 PROCEDURE — 99214 OFFICE O/P EST MOD 30 MIN: CPT | Performed by: OBSTETRICS & GYNECOLOGY

## 2025-04-24 PROCEDURE — 99459 PELVIC EXAMINATION: CPT | Performed by: OBSTETRICS & GYNECOLOGY

## 2025-04-24 RX ORDER — GABAPENTIN 300 MG/1
300 CAPSULE ORAL 3 TIMES DAILY
COMMUNITY
Start: 2025-03-26

## 2025-04-24 RX ORDER — LIDOCAINE 4 G/100G
PATCH TOPICAL
COMMUNITY
Start: 2025-03-26

## 2025-04-24 RX ORDER — CYCLOBENZAPRINE HCL 5 MG
TABLET ORAL
COMMUNITY
Start: 2025-03-26 | End: 2025-06-03

## 2025-04-24 RX ORDER — POLYVINYL ALCOHOL 14 MG/ML
SOLUTION/ DROPS OPHTHALMIC
COMMUNITY
Start: 2025-03-23

## 2025-04-24 RX ORDER — LANCETS
EACH MISCELLANEOUS
COMMUNITY
Start: 2025-01-23

## 2025-04-24 RX ORDER — ALBUTEROL SULFATE 0.83 MG/ML
SOLUTION RESPIRATORY (INHALATION)
COMMUNITY
Start: 2025-02-21

## 2025-04-24 RX ORDER — LANCETS
EACH MISCELLANEOUS 3 TIMES DAILY
COMMUNITY
Start: 2025-01-24

## 2025-04-24 RX ORDER — LIDOCAINE 50 MG/G
PATCH TOPICAL EVERY 24 HOURS
COMMUNITY
Start: 2025-04-01

## 2025-04-24 RX ORDER — BLOOD SUGAR DIAGNOSTIC
STRIP MISCELLANEOUS
COMMUNITY
Start: 2025-01-24

## 2025-04-24 RX ORDER — ECHINACEA PURPUREA EXTRACT 125 MG
TABLET ORAL
COMMUNITY
Start: 2025-02-27

## 2025-04-24 RX ORDER — NAPROXEN SODIUM 220 MG/1
TABLET, FILM COATED ORAL
COMMUNITY
Start: 2025-03-23

## 2025-04-24 NOTE — PROGRESS NOTES
Name: Deloris Snowden      : 1955      MRN: [id number]  Encounter Provider: Julito Leonard MD  Encounter Date: 2025   Encounter department: [facility] GYNECOLOGY ONCOLOGY [facility]  :  Assessment & Plan  Endometrial cancer (HCC)  70-year-old with a history of stage Ib grade 1 endometrial cancer treated in 2024, COPD on chronic oxygen therapy.  She is clinically and radiologically without evidence of disease recurrence.  Her performance status is 3.  1.  Continue MRD testing  2.  Return in 3 months for endometrial cancer surveillance.       Anemia due to stage 3b chronic kidney disease  (HCC)  Lab Results   Component Value Date    EGFR 50 2024    EGFR 46 2023    CREATININE 1.11 2024    CREATININE 1.19 2023   Last serum creatinine 3/25/2025 1.3 mg/dL with a GFR of 37 mL/min.  She has followed up with hematology.  Additional testing is pending.  Hemoglobin is 9 g/dL.         Thrombocytopenia (HCC)  Last platelet count 86K on 3/25/2025.  In , platelet count was 146K.  Total white blood cell count is also low at 3.1.  1.  Follow-up with hematology as scheduled.  2.  Consider bone marrow biopsy to evaluate for MDS.         Assessment & Plan            History of Present Illness   Reason for Visit / CC: Endometrial cancer surveillance, anemia, thrombocytopenia   Deloris Snowden is a 70 y.o. female   History of Present Illness  Who returns for endometrial cancer surveillance.  In the interim, she was admitted to [facility].  She was noted to have thrombocytopenia and anemia.  She saw Dr. Diaz who is performing some additional testing.  She had a CT scan of the abdomen pelvis on 3/23/2025 that did not reveal any evidence of recurrence.  There is a small right renal cyst.  Chest x-ray revealed mild increase of the right hemidiaphragm without evidence of visible disease recurrence.  She is not have any vaginal bleeding.  She is on  chronic oxygen therapy.  She had mild anemia and thrombocytopenia in 2023.  No other interval change in medications or medical history since her last visit.  Pertinent Medical History   Pancytopenia  COPD requiring oxygen       Oncology History   Cancer Staging   Endometrial cancer (HCC)  Staging form: Corpus Uteri - Carcinoma, AJCC 8th Edition  - Pathologic stage from 1/23/2024: FIGO Stage IB, calculated as Stage Unknown (pT1b, pNX, cM0) - Signed by Julito Leonard MD on 2/1/2024  Histologic grade (G): G1  Histologic grading system: 3 grade system  Lymph-vascular invasion (LVI): LVI not present (absent)/not identified  Oncology History   Endometrial cancer (HCC)   10/2/2023 Biopsy    Diagnosis:  ATYICAL GLANDULAR PROLIFERATION, FAVOR ENDOMETRIAL ORIGIN, SUGGESTIVE OF   COMPLEX ATYPICAL MUCINOUS METAPLASIA.  THE POSSIBILITY OF A MORE SERIOUS   LESION CANNOT BE EXCLUDED.  FURTHER TISSUE STUDES ARE SUGGESTED.      11/8/2023 Initial Diagnosis    Endometrial cancer (HCC)     1/23/2024 -  Cancer Staged    Staging form: Corpus Uteri - Carcinoma, AJCC 8th Edition  - Pathologic stage from 1/23/2024: FIGO Stage IB, calculated as Stage Unknown (pT1b, pNX, cM0) - Signed by Julito Leonard MD on 2/1/2024  Histologic grade (G): G1  Histologic grading system: 3 grade system  Lymph-vascular invasion (LVI): LVI not present (absent)/not identified       1/23/2024 Surgery    HYSTERECTOMY LAPAROSCOPIC TOTAL (LTH) W/ ROBOTICS. BILATERAL SALPINGO-OOPHORECTOMY, CYSTOSCOPY   Grade 1, 13 out of 15 mm depth of invasion, no LVSI, p53 wild-type. pMMR        Review of Systems   Constitutional:  Negative for activity change and unexpected weight change.   HENT: Negative.     Eyes: Negative.    Respiratory:  Positive for shortness of breath.    Cardiovascular: Negative.    Gastrointestinal:  Negative for abdominal distention and abdominal pain.   Endocrine: Negative.    Genitourinary:  Negative for pelvic pain and vaginal  bleeding.   Musculoskeletal:  Positive for arthralgias and gait problem.   Skin: Negative.    Allergic/Immunologic: Negative.    Hematological: Negative.    Psychiatric/Behavioral: Negative.      A complete review of systems is negative other than that noted above in the HPI.  Current Outpatient Medications on File Prior to Visit   Medication Sig Dispense Refill    Accu-Chek Guide Test test strip use 1 TEST STRIP to TEST BLOOD SUGAR three times a day      Accu-Chek Softclix Lancets lancets 3 (three) times a day      Accu-Chek Softclix Lancets lancets to check bg 3 times daily for 90 days      albuterol (2.5 mg/3 mL) 0.083 % nebulizer solution inhale contents of 1 vial ( 3 MILLILITERS ) in nebulizer by mouth...  (REFER TO PRESCRIPTION NOTES).      albuterol (PROVENTIL HFA,VENTOLIN HFA) 90 mcg/act inhaler as needed .      allopurinol (ZYLOPRIM) 100 mg tablet Take 100 mg by mouth 2 (two) times a day      Asperflex Pain Relieving 4 % PTCH apply 1 patch topically once daily      atorvastatin (LIPITOR) 20 mg tablet Take 20 mg by mouth daily at bedtime      Carboxymethylcellulose Sodium (ARTIFICIAL TEARS OP) Artificial Tears  suspended   VIVIAN BLOUNT      carvedilol (COREG) 3.125 mg tablet Take 3.125 mg by mouth 2 (two) times a day      Cholecalciferol 25 MCG (1000 UT) tablet Take 1 tablet by mouth daily      citalopram (CeleXA) 10 mg tablet Take 10 mg by mouth daily      cyclobenzaprine (FLEXERIL) 5 mg tablet daily at bedtime      divalproex sodium (DEPAKOTE ER) 500 mg 24 hr tablet Take 500 mg by mouth daily at bedtime 2 tabs at bedtime      fluticasone (FLONASE) 50 mcg/act nasal spray fluticasone propionate 0.05 MG/ACTUAT Metered Dose Nasal Spray  active   VIVIAN BLOUNT      folic acid (FOLVITE) 1 mg tablet Take 1,000 mcg by mouth daily      furosemide (LASIX) 40 mg tablet Take 40 mg by mouth daily      gabapentin (NEURONTIN) 300 mg capsule Take 300 mg by mouth 3 (three) times a day      Glucose Blood  (ACCU-CHEK GUIDE TEST VI) to check bg three times daily for 90 days      K Phos Moca-Sod Phos Di & Mono (Phospha 250 Neutral) [id number] MG TABS Take 1 tablet by mouth 2 (two) times a day      lidocaine (LIDODERM) 5 % every 24 hours      metFORMIN (GLUCOPHAGE) 500 mg tablet Take 500 mg by mouth 3 (three) times a day with meals      Mounjaro 5 MG/0.5ML every 7 days On Fridays      naproxen sodium (ALEVE) 220 MG tablet 1 tablet with food or milk as needed Orally every 12 hrs      nitroglycerin (NITROSTAT) 0.4 mg SL tablet PLACE 1 TABLET UNDER TONGUE EVERY 5 MINS, UP TO 3 DOSES AS NEEDED FOR CHEST PAIN Sublingual for 30 Days      nystatin powder apply as directed External Once a Day as Needed for 30 days      omeprazole (PriLOSEC) 20 mg delayed release capsule Take 20 mg by mouth daily      oxygen gas Inhale 4 L/min continuous Uses 4 L      oxygen gas 3 L/min      polyvinyl alcohol (LIQUIFILM TEARS) 1.4 % ophthalmic solution as needed for dry eyes      Potassium Chloride ER 20 MEQ TBCR Take 1 tablet by mouth 2 (two) times a day with meals      QUEtiapine (SEROquel) 100 mg tablet Take 100 mg by mouth daily at bedtime      sodium chloride (OCEAN) 0.65 % nasal spray instill 1 spray into each nostril four times a day if needed for congestion      vortioxetine (TRINTELLIX) 10 MG tablet Take 1 tablet by mouth daily      amLODIPine (NORVASC) 2.5 mg tablet Take 2.5 mg by mouth daily (Patient not taking: Reported on 3/12/2024)      escitalopram (LEXAPRO) 20 mg tablet Take 20 mg by mouth daily (Patient not taking: Reported on 3/12/2024)      gabapentin (NEURONTIN) 600 MG tablet Take 600 mg by mouth 3 (three) times a day 3-4 times a day (Patient not taking: Reported on 4/24/2025)      Mounjaro 2.5 MG/0.5ML Inject 2.5 mg Subcutaneous weekly (Patient not taking: Reported on 3/12/2024)       No current facility-administered medications on file prior to visit.         Objective   /62 (BP Location: Right arm, Patient Position:  Sitting, Cuff Size: Large)   Pulse 84   Temp 98.1 °F (36.7 °C) (Temporal)   Resp 18   Ht 5' (1.524 m)   Wt 82.2 kg (181 lb 3.2 oz)   SpO2 94%   BMI 35.39 kg/m²     Body mass index is 35.39 kg/m².  Pain Screening:  Pain Score: 0-No pain  ECOG   3  Physical Exam  Vitals reviewed. Exam conducted with a chaperone present.   Constitutional:       General: She is not in acute distress.     Appearance: Normal appearance. She is well-developed. She is not ill-appearing, toxic-appearing or diaphoretic.   HENT:      Head: Normocephalic and atraumatic.   Eyes:      General: No scleral icterus.     Extraocular Movements: Extraocular movements intact.      Conjunctiva/sclera: Conjunctivae normal.   Neck:      Thyroid: No thyromegaly.   Pulmonary:      Effort: No respiratory distress.      Breath sounds: Wheezing present. No rhonchi.      Comments: Cannot finish sentences without taking a breath.  Abdominal:      General: There is no distension.      Palpations: Abdomen is soft. There is no mass.      Tenderness: There is no abdominal tenderness. There is no guarding or rebound.      Hernia: No hernia is present.   Genitourinary:     Comments: The external female genitalia is normal. The bartholin's, uretheral and skenes glands are normal. The urethral meatus is normal (midline with no lesions). Anus without fissure or lesion. Speculum exam reveals a grossly normal vagina. No masses, lesions,discharge or bleeding. No significant cystocele or rectocele noted. Bimanual exam notes a surgical absent cervix, uterus and adnexal structures. No masses or fullness. Bladder is without fullness, mass or tenderness.    Musculoskeletal:         General: No swelling or tenderness.      Cervical back: Normal range of motion and neck supple.      Right lower leg: Edema present.      Left lower leg: Edema present.      Comments: 1+ bilateral lower extremity edema   Lymphadenopathy:      Cervical: No cervical adenopathy.   Skin:      "General: Skin is warm and dry.      Coloration: Skin is pale. Skin is not jaundiced.      Findings: No lesion or rash.   Neurological:      General: No focal deficit present.      Mental Status: She is alert and oriented to person, place, and time. Mental status is at baseline.      Cranial Nerves: No cranial nerve deficit.      Motor: No weakness.      Gait: Gait normal.   Psychiatric:         Mood and Affect: Mood normal.         Behavior: Behavior normal.         Thought Content: Thought content normal.         Judgment: Judgment normal.       Physical Exam       Results    Labs: I have reviewed pertinent labs.  CBC, CMP from [place]       Trend:  No results found for: \"\"    Radiology Results Review: I have reviewed radiology reports from [facility] including: chest xray and CT abdomen/pelvis.        "

## 2025-04-24 NOTE — ASSESSMENT & PLAN NOTE
Last platelet count 86K on 3/25/2025.  In 2023, platelet count was 146K.  Total white blood cell count is also low at 3.1.  1.  Follow-up with hematology as scheduled.  2.  Consider bone marrow biopsy to evaluate for MDS.

## 2025-04-24 NOTE — ASSESSMENT & PLAN NOTE
Lab Results   Component Value Date    EGFR 50 01/24/2024    EGFR 46 11/16/2023    CREATININE 1.11 01/24/2024    CREATININE 1.19 11/16/2023   Last serum creatinine 3/25/2025 1.3 mg/dL with a GFR of 37 mL/min.  She has followed up with hematology.  Additional testing is pending.  Hemoglobin is 9 g/dL.

## 2025-04-24 NOTE — ASSESSMENT & PLAN NOTE
70-year-old with a history of stage Ib grade 1 endometrial cancer treated in January 2024, COPD on chronic oxygen therapy.  She is clinically and radiologically without evidence of disease recurrence.  Her performance status is 3.  1.  Continue MRD testing  2.  Return in 3 months for endometrial cancer surveillance.

## 2025-06-12 ENCOUNTER — HOSPITAL ENCOUNTER (OUTPATIENT)
Dept: HOSPITAL 99 - OIDL | Age: 70
End: 2025-06-12
Payer: COMMERCIAL

## 2025-06-12 DIAGNOSIS — D69.6: Primary | ICD-10-CM

## 2025-06-12 DIAGNOSIS — D63.1: ICD-10-CM

## 2025-06-12 DIAGNOSIS — D50.9: ICD-10-CM

## 2025-06-12 LAB
BASOPHILS # BLD AUTO: 0 10^3/UL (ref 0–0.2)
BASOPHILS NFR BLD AUTO: 0.3 % (ref 0–2)
COMMENT: (no result)
EOSINOPHIL # BLD AUTO: 0.3 10^3/UL (ref 0–0.7)
EOSINOPHIL NFR BLD AUTO: 4.6 % (ref 0–6)
ERYTHROCYTE [DISTWIDTH] IN BLOOD BY AUTOMATED COUNT: 14.3 % (ref 11.5–14.5)
HCT VFR BLD AUTO: 30.2 % (ref 37–47)
HGB BLD-MCNC: 10.3 G/DL (ref 12–16)
IMM GRANULOCYTES # BLD AUTO: 0 10^3/UL (ref 0–0.05)
IMM GRANULOCYTES NFR BLD AUTO: 0.5 % (ref 0–0.5)
LYMPHOCYTES # BLD AUTO: 2.2 10^3/UL (ref 1.2–3.4)
LYMPHOCYTES NFR BLD AUTO: 33.9 % (ref 20.5–51.1)
MCH RBC QN AUTO: 33.3 PG (ref 27–31)
MCHC RBC AUTO-ENTMCNC: 34.1 G/DL (ref 33–37)
MCV RBC AUTO: 97.7 FL (ref 81–99)
MONOCYTES # BLD AUTO: 0.5 10^3/UL (ref 0.1–0.6)
MONOCYTES NFR BLD AUTO: 7.4 % (ref 1.7–9.3)
NEUTROPHILS # BLD AUTO: 3.4 10^3/UL (ref 1.4–6.5)
NEUTROPHILS NFR BLD AUTO: 53.3 % (ref 42.2–75.2)
NRBC BLD AUTO-RTO: (no result) %
PLATELET # BLD AUTO: 107 10^3/UL (ref 130–400)
PMV BLD AUTO: 12.2 FL (ref 7.4–10.4)
RBC # BLD AUTO: 3.09 10^6/UL (ref 4.2–5.4)
WBC # BLD AUTO: 6.4 10^3/UL (ref 4.8–10.8)

## 2025-06-30 ENCOUNTER — HOSPITAL ENCOUNTER (OUTPATIENT)
Dept: HOSPITAL 99 - OIDL | Age: 70
Discharge: HOME | End: 2025-06-30
Payer: COMMERCIAL

## 2025-06-30 DIAGNOSIS — D69.6: Primary | ICD-10-CM

## 2025-06-30 DIAGNOSIS — D50.9: ICD-10-CM

## 2025-06-30 DIAGNOSIS — D63.1: ICD-10-CM

## 2025-06-30 LAB
BASOPHILS # BLD AUTO: 0 10^3/UL (ref 0–0.2)
BASOPHILS NFR BLD AUTO: 0.1 % (ref 0–2)
COMMENT: (no result)
EOSINOPHIL # BLD AUTO: 0.3 10^3/UL (ref 0–0.7)
EOSINOPHIL NFR BLD AUTO: 4 % (ref 0–6)
ERYTHROCYTE [DISTWIDTH] IN BLOOD BY AUTOMATED COUNT: 14.9 % (ref 11.5–14.5)
HCT VFR BLD AUTO: 34 % (ref 37–47)
HGB BLD-MCNC: 11.3 G/DL (ref 12–16)
IMM GRANULOCYTES # BLD AUTO: 0 10^3/UL (ref 0–0.05)
IMM GRANULOCYTES NFR BLD AUTO: 0.4 % (ref 0–0.5)
LYMPHOCYTES # BLD AUTO: 1.9 10^3/UL (ref 1.2–3.4)
LYMPHOCYTES NFR BLD AUTO: 24.7 % (ref 20.5–51.1)
MCH RBC QN AUTO: 34.7 PG (ref 27–31)
MCHC RBC AUTO-ENTMCNC: 33.2 G/DL (ref 33–37)
MCV RBC AUTO: 104.3 FL (ref 81–99)
MONOCYTES # BLD AUTO: 0.5 10^3/UL (ref 0.1–0.6)
MONOCYTES NFR BLD AUTO: 5.9 % (ref 1.7–9.3)
NEUTROPHILS # BLD AUTO: 5 10^3/UL (ref 1.4–6.5)
NEUTROPHILS NFR BLD AUTO: 64.9 % (ref 42.2–75.2)
NRBC BLD AUTO-RTO: (no result) %
PLATELET # BLD AUTO: 93 10^3/UL (ref 130–400)
PMV BLD AUTO: 10.3 FL (ref 7.4–10.4)
RBC # BLD AUTO: 3.26 10^6/UL (ref 4.2–5.4)
WBC # BLD AUTO: 7.7 10^3/UL (ref 4.8–10.8)

## 2025-07-24 ENCOUNTER — HOSPITAL ENCOUNTER (INPATIENT)
Dept: HOSPITAL 99 - 4 EAST ACU | Age: 70
LOS: 6 days | Discharge: NURSING, REHAB OR LONG-TERM CARE | DRG: 683 | End: 2025-07-30
Payer: COMMERCIAL

## 2025-07-24 VITALS — DIASTOLIC BLOOD PRESSURE: 41 MMHG | SYSTOLIC BLOOD PRESSURE: 93 MMHG

## 2025-07-24 VITALS — SYSTOLIC BLOOD PRESSURE: 143 MMHG | DIASTOLIC BLOOD PRESSURE: 61 MMHG

## 2025-07-24 VITALS — SYSTOLIC BLOOD PRESSURE: 104 MMHG | DIASTOLIC BLOOD PRESSURE: 32 MMHG

## 2025-07-24 VITALS — SYSTOLIC BLOOD PRESSURE: 114 MMHG | DIASTOLIC BLOOD PRESSURE: 51 MMHG

## 2025-07-24 VITALS — DIASTOLIC BLOOD PRESSURE: 50 MMHG | SYSTOLIC BLOOD PRESSURE: 111 MMHG

## 2025-07-24 VITALS — BODY MASS INDEX: 25.6 KG/M2 | BODY MASS INDEX: 25.9 KG/M2 | BODY MASS INDEX: 26.2 KG/M2 | BODY MASS INDEX: 26 KG/M2

## 2025-07-24 VITALS — SYSTOLIC BLOOD PRESSURE: 107 MMHG | DIASTOLIC BLOOD PRESSURE: 46 MMHG

## 2025-07-24 VITALS — SYSTOLIC BLOOD PRESSURE: 135 MMHG | DIASTOLIC BLOOD PRESSURE: 61 MMHG

## 2025-07-24 DIAGNOSIS — I44.7: ICD-10-CM

## 2025-07-24 DIAGNOSIS — D50.9: ICD-10-CM

## 2025-07-24 DIAGNOSIS — Z99.81: ICD-10-CM

## 2025-07-24 DIAGNOSIS — J96.11: ICD-10-CM

## 2025-07-24 DIAGNOSIS — I10: ICD-10-CM

## 2025-07-24 DIAGNOSIS — D51.9: ICD-10-CM

## 2025-07-24 DIAGNOSIS — G89.29: ICD-10-CM

## 2025-07-24 DIAGNOSIS — M79.7: ICD-10-CM

## 2025-07-24 DIAGNOSIS — E11.9: ICD-10-CM

## 2025-07-24 DIAGNOSIS — K21.9: ICD-10-CM

## 2025-07-24 DIAGNOSIS — J96.12: ICD-10-CM

## 2025-07-24 DIAGNOSIS — Z79.84: ICD-10-CM

## 2025-07-24 DIAGNOSIS — E66.812: ICD-10-CM

## 2025-07-24 DIAGNOSIS — K52.9: ICD-10-CM

## 2025-07-24 DIAGNOSIS — M10.9: ICD-10-CM

## 2025-07-24 DIAGNOSIS — E78.00: ICD-10-CM

## 2025-07-24 DIAGNOSIS — G47.00: ICD-10-CM

## 2025-07-24 DIAGNOSIS — J44.9: ICD-10-CM

## 2025-07-24 DIAGNOSIS — G24.01: ICD-10-CM

## 2025-07-24 DIAGNOSIS — N17.9: Primary | ICD-10-CM

## 2025-07-24 DIAGNOSIS — G47.33: ICD-10-CM

## 2025-07-24 DIAGNOSIS — R16.1: ICD-10-CM

## 2025-07-24 DIAGNOSIS — F03.93: ICD-10-CM

## 2025-07-24 DIAGNOSIS — M54.9: ICD-10-CM

## 2025-07-24 DIAGNOSIS — D69.6: ICD-10-CM

## 2025-07-24 DIAGNOSIS — F31.9: ICD-10-CM

## 2025-07-24 DIAGNOSIS — F03.918: ICD-10-CM

## 2025-07-24 LAB
ALBUMIN SERPL-MCNC: 3.8 G/DL (ref 3.5–5)
ALP SERPL-CCNC: 126 U/L (ref 38–126)
ALT SERPL-CCNC: 11 U/L (ref 0–35)
APTT PPP: 23.8 SEC (ref 23.4–35)
AST SERPL-CCNC: 27 U/L (ref 14–36)
BASOPHILS # BLD AUTO: 0 10^3/UL (ref 0–0.2)
BASOPHILS NFR BLD AUTO: 0.4 % (ref 0–2)
BILIRUB SERPL-MCNC: 0.5 MG/DL (ref 0.2–1.3)
BUN SERPL-MCNC: 18 MG/DL (ref 7–17)
CALCIUM SERPL-MCNC: 9.1 MG/DL (ref 8.4–10.2)
CHLORIDE SERPL-SCNC: 99 MMOL/L (ref 98–107)
CO2 SERPL-SCNC: 28 MMOL/L (ref 22–30)
COMMENT: (no result)
CREAT SERPL-MCNC: 1.8 MG/DL (ref 0.6–1)
EGFR: 29.57
EOSINOPHIL # BLD AUTO: 0.3 10^3/UL (ref 0–0.7)
EOSINOPHIL NFR BLD AUTO: 3.9 % (ref 0–6)
ERYTHROCYTE [DISTWIDTH] IN BLOOD BY AUTOMATED COUNT: 15.3 % (ref 11.5–14.5)
ESTIMATED CREATININE CLEARANCE: (no result) ML/MIN
GLUCOSE SERPL-MCNC: 77 MG/DL (ref 70–99)
HCT VFR BLD AUTO: 32.3 % (ref 37–47)
HGB BLD-MCNC: 10.4 G/DL (ref 12–16)
IMM GRANULOCYTES # BLD AUTO: 0 10^3/UL (ref 0–0.05)
IMM GRANULOCYTES NFR BLD AUTO: 0.3 % (ref 0–0.5)
INR PPP: 1.09
LYMPHOCYTES # BLD AUTO: 1.9 10^3/UL (ref 1.2–3.4)
LYMPHOCYTES NFR BLD AUTO: 27.9 % (ref 20.5–51.1)
MCH RBC QN AUTO: 33.7 PG (ref 27–31)
MCHC RBC AUTO-ENTMCNC: 32.2 G/DL (ref 33–37)
MCV RBC AUTO: 104.5 FL (ref 81–99)
MONOCYTES # BLD AUTO: 0.4 10^3/UL (ref 0.1–0.6)
MONOCYTES NFR BLD AUTO: 6.1 % (ref 1.7–9.3)
NEUTROPHILS # BLD AUTO: 4.1 10^3/UL (ref 1.4–6.5)
NEUTROPHILS NFR BLD AUTO: 61.4 % (ref 42.2–75.2)
NRBC BLD AUTO-RTO: 0 %
PLATELET # BLD AUTO: 70 10^3/UL (ref 130–400)
PMV BLD AUTO: 11.3 FL (ref 7.4–10.4)
POTASSIUM SERPL-SCNC: 4.8 MMOL/L (ref 3.5–5.1)
PROT SERPL-MCNC: 6.2 G/DL (ref 6.3–8.2)
PROTHROMBIN TIME: 14.7 SEC (ref 11.4–14.6)
RBC # BLD AUTO: 3.09 10^6/UL (ref 4.2–5.4)
SODIUM SERPL-SCNC: 137 MMOL/L (ref 135–145)
TROPONIN I SERPL-MCNC: 0.01 NG/ML
TSH SERPL DL<=0.05 MIU/L-ACNC: 2.86 UIU/ML (ref 0.47–4.68)
WBC # BLD AUTO: 6.7 10^3/UL (ref 4.8–10.8)

## 2025-07-24 RX ADMIN — IOHEXOL 50 ML: 240 INJECTION, SOLUTION INTRATHECAL; INTRAVASCULAR; INTRAVENOUS; ORAL at 17:23

## 2025-07-24 RX ADMIN — SODIUM CHLORIDE 500: 900 INJECTION, SOLUTION INTRAVENOUS at 17:55

## 2025-07-24 RX ADMIN — SODIUM CHLORIDE 1000: 900 INJECTION, SOLUTION INTRAVENOUS at 23:51

## 2025-07-25 VITALS — DIASTOLIC BLOOD PRESSURE: 53 MMHG | SYSTOLIC BLOOD PRESSURE: 112 MMHG

## 2025-07-25 VITALS — DIASTOLIC BLOOD PRESSURE: 51 MMHG | SYSTOLIC BLOOD PRESSURE: 126 MMHG

## 2025-07-25 VITALS — OXYGEN SATURATION: 93 % | DIASTOLIC BLOOD PRESSURE: 37 MMHG | HEART RATE: 64 BPM | SYSTOLIC BLOOD PRESSURE: 76 MMHG

## 2025-07-25 VITALS — DIASTOLIC BLOOD PRESSURE: 55 MMHG | SYSTOLIC BLOOD PRESSURE: 66 MMHG

## 2025-07-25 VITALS — SYSTOLIC BLOOD PRESSURE: 119 MMHG | DIASTOLIC BLOOD PRESSURE: 53 MMHG

## 2025-07-25 VITALS — DIASTOLIC BLOOD PRESSURE: 43 MMHG | SYSTOLIC BLOOD PRESSURE: 120 MMHG

## 2025-07-25 VITALS — SYSTOLIC BLOOD PRESSURE: 119 MMHG | DIASTOLIC BLOOD PRESSURE: 59 MMHG

## 2025-07-25 VITALS — DIASTOLIC BLOOD PRESSURE: 43 MMHG | SYSTOLIC BLOOD PRESSURE: 110 MMHG

## 2025-07-25 VITALS — SYSTOLIC BLOOD PRESSURE: 109 MMHG | DIASTOLIC BLOOD PRESSURE: 45 MMHG

## 2025-07-25 VITALS — SYSTOLIC BLOOD PRESSURE: 120 MMHG | DIASTOLIC BLOOD PRESSURE: 57 MMHG

## 2025-07-25 VITALS — HEART RATE: 68 BPM

## 2025-07-25 VITALS — SYSTOLIC BLOOD PRESSURE: 76 MMHG | DIASTOLIC BLOOD PRESSURE: 37 MMHG

## 2025-07-25 LAB
ALBUMIN SERPL-MCNC: 3.4 G/DL (ref 3.5–5)
ALP SERPL-CCNC: 110 U/L (ref 38–126)
ALT SERPL-CCNC: < 10 U/L (ref 0–35)
AMORPH SED URNS QL MICRO: (no result)
APPEARANCE UR: CLEAR
AST SERPL-CCNC: 24 U/L (ref 14–36)
BASOPHILS # BLD AUTO: 0 10^3/UL (ref 0–0.2)
BASOPHILS NFR BLD AUTO: 0.6 % (ref 0–2)
BILIRUB SERPL-MCNC: 0.7 MG/DL (ref 0.2–1.3)
BILIRUB UR QL STRIP.AUTO: NEGATIVE
BUN SERPL-MCNC: 18 MG/DL (ref 7–17)
CALCIUM SERPL-MCNC: 8.5 MG/DL (ref 8.4–10.2)
CAOX CRY URNS QL MICRO: (no result)
CHLORIDE SERPL-SCNC: 102 MMOL/L (ref 98–107)
CO2 SERPL-SCNC: 29 MMOL/L (ref 22–30)
COLOR UR: YELLOW
COMMENT: (no result)
CREAT SERPL-MCNC: 1.6 MG/DL (ref 0.6–1)
CYSTINE CRY URNS QL MICRO: (no result)
EGFR: 34.05
EOSINOPHIL # BLD AUTO: 0.2 10^3/UL (ref 0–0.7)
EOSINOPHIL NFR BLD AUTO: 4.9 % (ref 0–6)
ERYTHROCYTE [DISTWIDTH] IN BLOOD BY AUTOMATED COUNT: 14.9 % (ref 11.5–14.5)
ESTIMATED CREATININE CLEARANCE: 33 ML/MIN
GLUCOSE - POINT OF CARE: 72 MG/DL (ref 70–99)
GLUCOSE - POINT OF CARE: 76 MG/DL (ref 70–99)
GLUCOSE - POINT OF CARE: 86 MG/DL (ref 70–99)
GLUCOSE - POINT OF CARE: 88 MG/DL (ref 70–99)
GLUCOSE SERPL-MCNC: 74 MG/DL (ref 70–99)
GLUCOSE UR QL STRIP.AUTO: NEGATIVE
GRAN CASTS URNS QL MICRO: (no result) /LPF
HBA1C MFR BLD: 5 % (ref 4–5.6)
HCT VFR BLD AUTO: 30 % (ref 37–47)
HGB BLD-MCNC: 10 G/DL (ref 12–16)
HGB UR QL: (no result)
HYALINE CASTS URNS QL MICRO: (no result) /LPF (ref 0–2)
IMM GRANULOCYTES # BLD AUTO: 0 10^3/UL (ref 0–0.05)
IMM GRANULOCYTES NFR BLD AUTO: 0.4 % (ref 0–0.5)
LEUKOCYTE ESTERASE UR QL STRIP.AUTO: (no result)
LYMPHOCYTES # BLD AUTO: 1.8 10^3/UL (ref 1.2–3.4)
LYMPHOCYTES NFR BLD AUTO: 37.4 % (ref 20.5–51.1)
Lab: (no result) /LPF
Lab: (no result) /LPF
MCH RBC QN AUTO: 34.1 PG (ref 27–31)
MCHC RBC AUTO-ENTMCNC: 33.3 G/DL (ref 33–37)
MCV RBC AUTO: 102.4 FL (ref 81–99)
MONOCYTES # BLD AUTO: 0.3 10^3/UL (ref 0.1–0.6)
MONOCYTES NFR BLD AUTO: 6.2 % (ref 1.7–9.3)
MUCOUS THREADS URNS QL MICRO: (no result)
NEUTROPHILS # BLD AUTO: 2.5 10^3/UL (ref 1.4–6.5)
NEUTROPHILS NFR BLD AUTO: 50.5 % (ref 42.2–75.2)
NITRITE UR QL STRIP.AUTO: NEGATIVE
NRBC BLD AUTO-RTO: 0 %
PH UR: 6 [PH] (ref 5–9)
PLATELET # BLD AUTO: 61 10^3/UL (ref 130–400)
PMV BLD AUTO: 11.1 FL (ref 7.4–10.4)
POTASSIUM SERPL-SCNC: 5.2 MMOL/L (ref 3.5–5.1)
PROT SERPL-MCNC: 5.7 G/DL (ref 6.3–8.2)
RBC # BLD AUTO: 2.93 10^6/UL (ref 4.2–5.4)
SODIUM SERPL-SCNC: 137 MMOL/L (ref 135–145)
SP GR UR: 1.01 (ref ?–1.03)
SQUAMOUS URNS QL MICRO: (no result) /LPF
TRI-PHOS CRY URNS QL MICRO: (no result)
URATE CRY URNS QL MICRO: (no result)
URINE ALBUMIN: NEGATIVE
URINE BACTERIA: (no result)
URINE EPITHELIAL CAST: (no result) /LPF
URINE KETONE: NEGATIVE
URINE OTHER: (no result)
URINE RED BLOOD CELL: (no result) /HPF (ref 0–2)
URINE RED CELL CAST: (no result) /LPF
URINE SPERM: (no result)
URINE TRICHOMONAS: (no result)
URINE WHITE CELL CAST: (no result) /LPF
URINE WHITE CELL: (no result) /HPF (ref 0–5)
UROBILINOGEN UR QL STRIP.AUTO: NEGATIVE
VALPROATE SERPL-MCNC: 79.3 UG/ML (ref 50–120)
WBC # BLD AUTO: 4.9 10^3/UL (ref 4.8–10.8)
YEAST #/AREA URNS HPF: (no result) /[HPF]

## 2025-07-25 PROCEDURE — 5A09357 ASSISTANCE WITH RESPIRATORY VENTILATION, LESS THAN 24 CONSECUTIVE HOURS, CONTINUOUS POSITIVE AIRWAY PRESSURE: ICD-10-PCS | Performed by: HOSPITALIST

## 2025-07-25 RX ADMIN — GABAPENTIN 300 MG: 300 CAPSULE ORAL at 09:39

## 2025-07-25 RX ADMIN — INSULIN ASPART: 100 INJECTION, SOLUTION INTRAVENOUS; SUBCUTANEOUS at 17:34

## 2025-07-25 RX ADMIN — TAZOBACTAM SODIUM AND PIPERACILLIN SODIUM 50: 500; 4 INJECTION, SOLUTION INTRAVENOUS at 23:24

## 2025-07-25 RX ADMIN — CYCLOBENZAPRINE HYDROCHLORIDE 5 MG: 10 TABLET, FILM COATED ORAL at 17:33

## 2025-07-25 RX ADMIN — GABAPENTIN 300 MG: 300 CAPSULE ORAL at 20:27

## 2025-07-25 RX ADMIN — TAZOBACTAM SODIUM AND PIPERACILLIN SODIUM 50: 500; 4 INJECTION, SOLUTION INTRAVENOUS at 17:33

## 2025-07-25 RX ADMIN — CITALOPRAM HYDROBROMIDE 10 MG: 10 TABLET ORAL at 20:29

## 2025-07-25 RX ADMIN — GABAPENTIN 300 MG: 300 CAPSULE ORAL at 16:12

## 2025-07-25 RX ADMIN — INSULIN ASPART: 100 INJECTION, SOLUTION INTRAVENOUS; SUBCUTANEOUS at 13:31

## 2025-07-25 RX ADMIN — Medication 1 MG: at 09:46

## 2025-07-25 RX ADMIN — POLYETHYLENE GLYCOL 3350 17 GRAMS: 17 POWDER, FOR SOLUTION ORAL at 09:47

## 2025-07-25 RX ADMIN — CARVEDILOL 3.12 MG: 3.12 TABLET, FILM COATED ORAL at 20:17

## 2025-07-25 RX ADMIN — ALBUTEROL SULFATE: 2.5 SOLUTION RESPIRATORY (INHALATION) at 16:05

## 2025-07-25 RX ADMIN — LIDOCAINE 3 PATCH: 4 PATCH TOPICAL at 09:45

## 2025-07-25 RX ADMIN — DIVALPROEX SODIUM 1000 MG: 500 TABLET, FILM COATED, EXTENDED RELEASE ORAL at 17:33

## 2025-07-25 RX ADMIN — TAZOBACTAM SODIUM AND PIPERACILLIN SODIUM 50: 500; 4 INJECTION, SOLUTION INTRAVENOUS at 00:42

## 2025-07-25 RX ADMIN — INSULIN ASPART: 100 INJECTION, SOLUTION INTRAVENOUS; SUBCUTANEOUS at 09:22

## 2025-07-25 RX ADMIN — Medication 25 MCG: at 09:42

## 2025-07-25 RX ADMIN — ATORVASTATIN CALCIUM 20 MG: 20 TABLET, FILM COATED ORAL at 09:41

## 2025-07-25 RX ADMIN — QUETIAPINE FUMARATE 100 MG: 100 TABLET ORAL at 17:33

## 2025-07-25 RX ADMIN — Medication 1 MG: at 20:24

## 2025-07-25 RX ADMIN — ACETAMINOPHEN 650 MG: 325 TABLET ORAL at 16:12

## 2025-07-25 RX ADMIN — FOLIC ACID 1 MG: 1 TABLET ORAL at 09:42

## 2025-07-25 RX ADMIN — TAZOBACTAM SODIUM AND PIPERACILLIN SODIUM 50: 500; 4 INJECTION, SOLUTION INTRAVENOUS at 13:30

## 2025-07-25 RX ADMIN — ALLOPURINOL 100 MG: 100 TABLET ORAL at 09:41

## 2025-07-25 RX ADMIN — BENZTROPINE MESYLATE 1 MG: 1 TABLET ORAL at 09:39

## 2025-07-25 RX ADMIN — CARVEDILOL 3.12 MG: 3.12 TABLET, FILM COATED ORAL at 09:42

## 2025-07-25 RX ADMIN — BENZTROPINE MESYLATE 1 MG: 1 TABLET ORAL at 20:16

## 2025-07-25 RX ADMIN — TAZOBACTAM SODIUM AND PIPERACILLIN SODIUM 50: 500; 4 INJECTION, SOLUTION INTRAVENOUS at 08:16

## 2025-07-25 RX ADMIN — ALLOPURINOL 100 MG: 100 TABLET ORAL at 20:16

## 2025-07-25 RX ADMIN — ALBUTEROL SULFATE 2.5 MG: 2.5 SOLUTION RESPIRATORY (INHALATION) at 18:55

## 2025-07-25 RX ADMIN — PANTOPRAZOLE SODIUM 40 MG: 40 TABLET, DELAYED RELEASE ORAL at 09:39

## 2025-07-26 VITALS — SYSTOLIC BLOOD PRESSURE: 111 MMHG | DIASTOLIC BLOOD PRESSURE: 76 MMHG

## 2025-07-26 VITALS — DIASTOLIC BLOOD PRESSURE: 46 MMHG | SYSTOLIC BLOOD PRESSURE: 125 MMHG

## 2025-07-26 VITALS — SYSTOLIC BLOOD PRESSURE: 124 MMHG | DIASTOLIC BLOOD PRESSURE: 50 MMHG

## 2025-07-26 VITALS — HEART RATE: 69 BPM

## 2025-07-26 LAB
ALBUMIN SERPL-MCNC: 3.2 G/DL (ref 3.5–5)
ALP SERPL-CCNC: 101 U/L (ref 38–126)
ALT SERPL-CCNC: < 10 U/L (ref 0–35)
AST SERPL-CCNC: 22 U/L (ref 14–36)
BASOPHILS # BLD AUTO: 0 10^3/UL (ref 0–0.2)
BASOPHILS NFR BLD AUTO: 0.5 % (ref 0–2)
BILIRUB SERPL-MCNC: 0.7 MG/DL (ref 0.2–1.3)
BUN SERPL-MCNC: 18 MG/DL (ref 7–17)
CALCIUM SERPL-MCNC: 9.1 MG/DL (ref 8.4–10.2)
CHLORIDE SERPL-SCNC: 101 MMOL/L (ref 98–107)
CO2 SERPL-SCNC: 32 MMOL/L (ref 22–30)
COMMENT: (no result)
CREAT SERPL-MCNC: 1.9 MG/DL (ref 0.6–1)
EGFR: 27.71
EOSINOPHIL # BLD AUTO: 0.2 10^3/UL (ref 0–0.7)
EOSINOPHIL NFR BLD AUTO: 4.7 % (ref 0–6)
ERYTHROCYTE [DISTWIDTH] IN BLOOD BY AUTOMATED COUNT: 14.7 % (ref 11.5–14.5)
ESTIMATED CREATININE CLEARANCE: 28 ML/MIN
GLUCOSE - POINT OF CARE: 114 MG/DL (ref 70–99)
GLUCOSE - POINT OF CARE: 120 MG/DL (ref 70–99)
GLUCOSE - POINT OF CARE: 122 MG/DL (ref 70–99)
GLUCOSE - POINT OF CARE: 75 MG/DL (ref 70–99)
GLUCOSE SERPL-MCNC: 77 MG/DL (ref 70–99)
HCT VFR BLD AUTO: 28.5 % (ref 37–47)
HGB BLD-MCNC: 9.7 G/DL (ref 12–16)
IMM GRANULOCYTES # BLD AUTO: 0 10^3/UL (ref 0–0.05)
IMM GRANULOCYTES NFR BLD AUTO: 0.3 % (ref 0–0.5)
LYMPHOCYTES # BLD AUTO: 1.3 10^3/UL (ref 1.2–3.4)
LYMPHOCYTES NFR BLD AUTO: 33.9 % (ref 20.5–51.1)
MCH RBC QN AUTO: 35 PG (ref 27–31)
MCHC RBC AUTO-ENTMCNC: 34 G/DL (ref 33–37)
MCV RBC AUTO: 102.9 FL (ref 81–99)
MONOCYTES # BLD AUTO: 0.3 10^3/UL (ref 0.1–0.6)
MONOCYTES NFR BLD AUTO: 7.2 % (ref 1.7–9.3)
NEUTROPHILS # BLD AUTO: 2.1 10^3/UL (ref 1.4–6.5)
NEUTROPHILS NFR BLD AUTO: 53.4 % (ref 42.2–75.2)
NRBC BLD AUTO-RTO: 0 %
PLATELET # BLD AUTO: 54 10^3/UL (ref 130–400)
PMV BLD AUTO: 11.1 FL (ref 7.4–10.4)
POTASSIUM SERPL-SCNC: 4.5 MMOL/L (ref 3.5–5.1)
PROT SERPL-MCNC: 5.4 G/DL (ref 6.3–8.2)
RBC # BLD AUTO: 2.77 10^6/UL (ref 4.2–5.4)
SODIUM SERPL-SCNC: 136 MMOL/L (ref 135–145)
WBC # BLD AUTO: 3.9 10^3/UL (ref 4.8–10.8)

## 2025-07-26 RX ADMIN — ALBUTEROL SULFATE 2.5 MG: 2.5 SOLUTION RESPIRATORY (INHALATION) at 13:03

## 2025-07-26 RX ADMIN — TAZOBACTAM SODIUM AND PIPERACILLIN SODIUM 50: 500; 4 INJECTION, SOLUTION INTRAVENOUS at 05:24

## 2025-07-26 RX ADMIN — GABAPENTIN 300 MG: 300 CAPSULE ORAL at 17:30

## 2025-07-26 RX ADMIN — TAZOBACTAM SODIUM AND PIPERACILLIN SODIUM 50: 500; 4 INJECTION, SOLUTION INTRAVENOUS at 23:07

## 2025-07-26 RX ADMIN — ALLOPURINOL 100 MG: 100 TABLET ORAL at 08:08

## 2025-07-26 RX ADMIN — LIDOCAINE 3 PATCH: 4 PATCH TOPICAL at 08:07

## 2025-07-26 RX ADMIN — Medication 25 MCG: at 08:11

## 2025-07-26 RX ADMIN — GABAPENTIN 300 MG: 300 CAPSULE ORAL at 19:56

## 2025-07-26 RX ADMIN — POLYETHYLENE GLYCOL 3350 17 GRAMS: 17 POWDER, FOR SOLUTION ORAL at 08:08

## 2025-07-26 RX ADMIN — GABAPENTIN 300 MG: 300 CAPSULE ORAL at 08:08

## 2025-07-26 RX ADMIN — INSULIN ASPART: 100 INJECTION, SOLUTION INTRAVENOUS; SUBCUTANEOUS at 12:44

## 2025-07-26 RX ADMIN — BENZTROPINE MESYLATE 1 MG: 1 TABLET ORAL at 08:08

## 2025-07-26 RX ADMIN — CARVEDILOL 3.12 MG: 3.12 TABLET, FILM COATED ORAL at 19:55

## 2025-07-26 RX ADMIN — FOLIC ACID 1 MG: 1 TABLET ORAL at 08:11

## 2025-07-26 RX ADMIN — CYCLOBENZAPRINE HYDROCHLORIDE 5 MG: 10 TABLET, FILM COATED ORAL at 18:21

## 2025-07-26 RX ADMIN — INSULIN ASPART: 100 INJECTION, SOLUTION INTRAVENOUS; SUBCUTANEOUS at 17:31

## 2025-07-26 RX ADMIN — CARVEDILOL 3.12 MG: 3.12 TABLET, FILM COATED ORAL at 08:10

## 2025-07-26 RX ADMIN — QUETIAPINE FUMARATE 100 MG: 100 TABLET ORAL at 18:21

## 2025-07-26 RX ADMIN — ALBUTEROL SULFATE 2.5 MG: 2.5 SOLUTION RESPIRATORY (INHALATION) at 20:44

## 2025-07-26 RX ADMIN — ATORVASTATIN CALCIUM 20 MG: 20 TABLET, FILM COATED ORAL at 08:11

## 2025-07-26 RX ADMIN — BENZTROPINE MESYLATE 1 MG: 1 TABLET ORAL at 19:56

## 2025-07-26 RX ADMIN — INSULIN ASPART: 100 INJECTION, SOLUTION INTRAVENOUS; SUBCUTANEOUS at 09:30

## 2025-07-26 RX ADMIN — TAZOBACTAM SODIUM AND PIPERACILLIN SODIUM 50: 500; 4 INJECTION, SOLUTION INTRAVENOUS at 17:30

## 2025-07-26 RX ADMIN — DIVALPROEX SODIUM 1000 MG: 500 TABLET, FILM COATED, EXTENDED RELEASE ORAL at 18:21

## 2025-07-26 RX ADMIN — PANTOPRAZOLE SODIUM 40 MG: 40 TABLET, DELAYED RELEASE ORAL at 08:08

## 2025-07-26 RX ADMIN — TAZOBACTAM SODIUM AND PIPERACILLIN SODIUM 50: 500; 4 INJECTION, SOLUTION INTRAVENOUS at 11:55

## 2025-07-26 RX ADMIN — ALLOPURINOL 100 MG: 100 TABLET ORAL at 19:55

## 2025-07-26 RX ADMIN — Medication 1 MG: at 19:55

## 2025-07-26 RX ADMIN — SODIUM CHLORIDE 1000: 900 INJECTION, SOLUTION INTRAVENOUS at 13:13

## 2025-07-26 RX ADMIN — Medication 1 MG: at 08:09

## 2025-07-26 RX ADMIN — ALBUTEROL SULFATE 2.5 MG: 2.5 SOLUTION RESPIRATORY (INHALATION) at 07:31

## 2025-07-26 RX ADMIN — CITALOPRAM HYDROBROMIDE 10 MG: 10 TABLET ORAL at 18:21

## 2025-07-27 VITALS — DIASTOLIC BLOOD PRESSURE: 65 MMHG | SYSTOLIC BLOOD PRESSURE: 134 MMHG

## 2025-07-27 VITALS — SYSTOLIC BLOOD PRESSURE: 147 MMHG | DIASTOLIC BLOOD PRESSURE: 65 MMHG

## 2025-07-27 VITALS — DIASTOLIC BLOOD PRESSURE: 48 MMHG | SYSTOLIC BLOOD PRESSURE: 129 MMHG

## 2025-07-27 LAB
ALBUMIN SERPL-MCNC: 3.1 G/DL (ref 3.5–5)
ALP SERPL-CCNC: 97 U/L (ref 38–126)
ALT SERPL-CCNC: < 10 U/L (ref 0–35)
AST SERPL-CCNC: 21 U/L (ref 14–36)
BASOPHILS # BLD AUTO: 0 10^3/UL (ref 0–0.2)
BASOPHILS NFR BLD AUTO: 0.3 % (ref 0–2)
BILIRUB SERPL-MCNC: 0.5 MG/DL (ref 0.2–1.3)
BUN SERPL-MCNC: 14 MG/DL (ref 7–17)
CALCIUM SERPL-MCNC: 8.6 MG/DL (ref 8.4–10.2)
CHLORIDE SERPL-SCNC: 108 MMOL/L (ref 98–107)
CO2 SERPL-SCNC: 29 MMOL/L (ref 22–30)
COMMENT: (no result)
CREAT SERPL-MCNC: 1.7 MG/DL (ref 0.6–1)
EGFR: 31.66
EOSINOPHIL # BLD AUTO: 0.2 10^3/UL (ref 0–0.7)
EOSINOPHIL NFR BLD AUTO: 6.3 % (ref 0–6)
ERYTHROCYTE [DISTWIDTH] IN BLOOD BY AUTOMATED COUNT: 14.9 % (ref 11.5–14.5)
ESTIMATED CREATININE CLEARANCE: 31 ML/MIN
GLUCOSE - POINT OF CARE: 110 MG/DL (ref 70–99)
GLUCOSE - POINT OF CARE: 111 MG/DL (ref 70–99)
GLUCOSE - POINT OF CARE: 118 MG/DL (ref 70–99)
GLUCOSE - POINT OF CARE: 138 MG/DL (ref 70–99)
GLUCOSE - POINT OF CARE: 54 MG/DL (ref 70–99)
GLUCOSE - POINT OF CARE: 58 MG/DL (ref 70–99)
GLUCOSE SERPL-MCNC: 75 MG/DL (ref 70–99)
HCT VFR BLD AUTO: 26.8 % (ref 37–47)
HGB BLD-MCNC: 9.1 G/DL (ref 12–16)
IMM GRANULOCYTES # BLD AUTO: 0 10^3/UL (ref 0–0.05)
IMM GRANULOCYTES NFR BLD AUTO: 0.3 % (ref 0–0.5)
LYMPHOCYTES # BLD AUTO: 1.1 10^3/UL (ref 1.2–3.4)
LYMPHOCYTES NFR BLD AUTO: 35.5 % (ref 20.5–51.1)
MCH RBC QN AUTO: 35 PG (ref 27–31)
MCHC RBC AUTO-ENTMCNC: 34 G/DL (ref 33–37)
MCV RBC AUTO: 103.1 FL (ref 81–99)
MONOCYTES # BLD AUTO: 0.2 10^3/UL (ref 0.1–0.6)
MONOCYTES NFR BLD AUTO: 7.5 % (ref 1.7–9.3)
NEUTROPHILS # BLD AUTO: 1.6 10^3/UL (ref 1.4–6.5)
NEUTROPHILS NFR BLD AUTO: 50.1 % (ref 42.2–75.2)
NRBC BLD AUTO-RTO: 0 %
PLATELET # BLD AUTO: 59 10^3/UL (ref 130–400)
PMV BLD AUTO: 11.3 FL (ref 7.4–10.4)
POTASSIUM SERPL-SCNC: 4.2 MMOL/L (ref 3.5–5.1)
PROT SERPL-MCNC: 5.2 G/DL (ref 6.3–8.2)
RBC # BLD AUTO: 2.6 10^6/UL (ref 4.2–5.4)
SODIUM SERPL-SCNC: 143 MMOL/L (ref 135–145)
WBC # BLD AUTO: 3.2 10^3/UL (ref 4.8–10.8)

## 2025-07-27 RX ADMIN — ALLOPURINOL 100 MG: 100 TABLET ORAL at 20:06

## 2025-07-27 RX ADMIN — DIVALPROEX SODIUM 1000 MG: 500 TABLET, FILM COATED, EXTENDED RELEASE ORAL at 18:03

## 2025-07-27 RX ADMIN — Medication 1 MG: at 08:25

## 2025-07-27 RX ADMIN — LIDOCAINE 3 PATCH: 4 PATCH TOPICAL at 08:26

## 2025-07-27 RX ADMIN — GABAPENTIN 300 MG: 300 CAPSULE ORAL at 15:15

## 2025-07-27 RX ADMIN — ALBUTEROL SULFATE 2.5 MG: 2.5 SOLUTION RESPIRATORY (INHALATION) at 19:53

## 2025-07-27 RX ADMIN — CITALOPRAM HYDROBROMIDE 10 MG: 10 TABLET ORAL at 18:03

## 2025-07-27 RX ADMIN — SODIUM CHLORIDE 1000: 900 INJECTION, SOLUTION INTRAVENOUS at 15:16

## 2025-07-27 RX ADMIN — ALLOPURINOL 100 MG: 100 TABLET ORAL at 08:25

## 2025-07-27 RX ADMIN — POLYETHYLENE GLYCOL 3350 17 GRAMS: 17 POWDER, FOR SOLUTION ORAL at 08:25

## 2025-07-27 RX ADMIN — FOLIC ACID 1 MG: 1 TABLET ORAL at 08:25

## 2025-07-27 RX ADMIN — GABAPENTIN 300 MG: 300 CAPSULE ORAL at 20:07

## 2025-07-27 RX ADMIN — INSULIN ASPART: 100 INJECTION, SOLUTION INTRAVENOUS; SUBCUTANEOUS at 12:49

## 2025-07-27 RX ADMIN — TAZOBACTAM SODIUM AND PIPERACILLIN SODIUM 50: 500; 4 INJECTION, SOLUTION INTRAVENOUS at 18:03

## 2025-07-27 RX ADMIN — PANTOPRAZOLE SODIUM 40 MG: 40 TABLET, DELAYED RELEASE ORAL at 08:26

## 2025-07-27 RX ADMIN — ALBUTEROL SULFATE 2.5 MG: 2.5 SOLUTION RESPIRATORY (INHALATION) at 14:39

## 2025-07-27 RX ADMIN — QUETIAPINE FUMARATE 100 MG: 100 TABLET ORAL at 18:03

## 2025-07-27 RX ADMIN — TAZOBACTAM SODIUM AND PIPERACILLIN SODIUM 50: 500; 4 INJECTION, SOLUTION INTRAVENOUS at 23:49

## 2025-07-27 RX ADMIN — INSULIN ASPART: 100 INJECTION, SOLUTION INTRAVENOUS; SUBCUTANEOUS at 08:24

## 2025-07-27 RX ADMIN — TAZOBACTAM SODIUM AND PIPERACILLIN SODIUM 50: 500; 4 INJECTION, SOLUTION INTRAVENOUS at 11:45

## 2025-07-27 RX ADMIN — Medication 25 MCG: at 08:25

## 2025-07-27 RX ADMIN — ATORVASTATIN CALCIUM 20 MG: 20 TABLET, FILM COATED ORAL at 08:25

## 2025-07-27 RX ADMIN — BENZTROPINE MESYLATE 1 MG: 1 TABLET ORAL at 08:25

## 2025-07-27 RX ADMIN — CARVEDILOL 3.12 MG: 3.12 TABLET, FILM COATED ORAL at 20:06

## 2025-07-27 RX ADMIN — INSULIN ASPART: 100 INJECTION, SOLUTION INTRAVENOUS; SUBCUTANEOUS at 17:31

## 2025-07-27 RX ADMIN — CYCLOBENZAPRINE HYDROCHLORIDE 5 MG: 10 TABLET, FILM COATED ORAL at 18:03

## 2025-07-27 RX ADMIN — ALBUTEROL SULFATE 2.5 MG: 2.5 SOLUTION RESPIRATORY (INHALATION) at 07:41

## 2025-07-27 RX ADMIN — Medication 1 MG: at 20:06

## 2025-07-27 RX ADMIN — TAZOBACTAM SODIUM AND PIPERACILLIN SODIUM 50: 500; 4 INJECTION, SOLUTION INTRAVENOUS at 05:17

## 2025-07-27 RX ADMIN — SODIUM CHLORIDE 1000: 900 INJECTION, SOLUTION INTRAVENOUS at 02:03

## 2025-07-27 RX ADMIN — GABAPENTIN 300 MG: 300 CAPSULE ORAL at 08:25

## 2025-07-27 RX ADMIN — CARVEDILOL 3.12 MG: 3.12 TABLET, FILM COATED ORAL at 08:26

## 2025-07-27 RX ADMIN — BENZTROPINE MESYLATE 1 MG: 1 TABLET ORAL at 20:06

## 2025-07-28 VITALS — SYSTOLIC BLOOD PRESSURE: 136 MMHG | DIASTOLIC BLOOD PRESSURE: 67 MMHG | OXYGEN SATURATION: 96 % | HEART RATE: 88 BPM

## 2025-07-28 VITALS — DIASTOLIC BLOOD PRESSURE: 66 MMHG | SYSTOLIC BLOOD PRESSURE: 135 MMHG

## 2025-07-28 VITALS — SYSTOLIC BLOOD PRESSURE: 122 MMHG | DIASTOLIC BLOOD PRESSURE: 57 MMHG

## 2025-07-28 VITALS — SYSTOLIC BLOOD PRESSURE: 138 MMHG | DIASTOLIC BLOOD PRESSURE: 61 MMHG

## 2025-07-28 VITALS — HEART RATE: 74 BPM

## 2025-07-28 LAB
BUN SERPL-MCNC: 8 MG/DL (ref 7–17)
CALCIUM SERPL-MCNC: 8.7 MG/DL (ref 8.4–10.2)
CHLORIDE SERPL-SCNC: 111 MMOL/L (ref 98–107)
CO2 SERPL-SCNC: 26 MMOL/L (ref 22–30)
COMMENT: (no result)
CREAT SERPL-MCNC: 1.4 MG/DL (ref 0.6–1)
EGFR: 39.97
ERYTHROCYTE [DISTWIDTH] IN BLOOD BY AUTOMATED COUNT: 15.5 % (ref 11.5–14.5)
ESTIMATED CREATININE CLEARANCE: 38 ML/MIN
GLUCOSE - POINT OF CARE: 112 MG/DL (ref 70–99)
GLUCOSE - POINT OF CARE: 160 MG/DL (ref 70–99)
GLUCOSE - POINT OF CARE: 67 MG/DL (ref 70–99)
GLUCOSE - POINT OF CARE: 90 MG/DL (ref 70–99)
GLUCOSE - POINT OF CARE: 90 MG/DL (ref 70–99)
GLUCOSE - POINT OF CARE: 91 MG/DL (ref 70–99)
GLUCOSE SERPL-MCNC: 112 MG/DL (ref 70–99)
HCT VFR BLD AUTO: 30.5 % (ref 37–47)
HGB BLD-MCNC: 10 G/DL (ref 12–16)
MCH RBC QN AUTO: 34.6 PG (ref 27–31)
MCHC RBC AUTO-ENTMCNC: 32.8 G/DL (ref 33–37)
MCV RBC AUTO: 105.5 FL (ref 81–99)
PLATELET # BLD AUTO: 64 10^3/UL (ref 130–400)
PMV BLD AUTO: 10.9 FL (ref 7.4–10.4)
POTASSIUM SERPL-SCNC: 4 MMOL/L (ref 3.5–5.1)
RBC # BLD AUTO: 2.89 10^6/UL (ref 4.2–5.4)
SODIUM SERPL-SCNC: 144 MMOL/L (ref 135–145)
WBC # BLD AUTO: 3.7 10^3/UL (ref 4.8–10.8)

## 2025-07-28 RX ADMIN — Medication 250 MG: at 20:30

## 2025-07-28 RX ADMIN — ALBUTEROL SULFATE 2.5 MG: 2.5 SOLUTION RESPIRATORY (INHALATION) at 08:43

## 2025-07-28 RX ADMIN — PANTOPRAZOLE SODIUM 40 MG: 40 TABLET, DELAYED RELEASE ORAL at 09:44

## 2025-07-28 RX ADMIN — TAZOBACTAM SODIUM AND PIPERACILLIN SODIUM 50: 500; 4 INJECTION, SOLUTION INTRAVENOUS at 12:41

## 2025-07-28 RX ADMIN — LIDOCAINE 3 PATCH: 4 PATCH TOPICAL at 09:44

## 2025-07-28 RX ADMIN — CARVEDILOL 3.12 MG: 3.12 TABLET, FILM COATED ORAL at 09:44

## 2025-07-28 RX ADMIN — GABAPENTIN 300 MG: 300 CAPSULE ORAL at 20:29

## 2025-07-28 RX ADMIN — Medication 250 MG: at 09:54

## 2025-07-28 RX ADMIN — DIVALPROEX SODIUM 1000 MG: 500 TABLET, FILM COATED, EXTENDED RELEASE ORAL at 18:11

## 2025-07-28 RX ADMIN — TAZOBACTAM SODIUM AND PIPERACILLIN SODIUM 50: 500; 4 INJECTION, SOLUTION INTRAVENOUS at 17:44

## 2025-07-28 RX ADMIN — CARVEDILOL 3.12 MG: 3.12 TABLET, FILM COATED ORAL at 20:29

## 2025-07-28 RX ADMIN — TAZOBACTAM SODIUM AND PIPERACILLIN SODIUM 50: 500; 4 INJECTION, SOLUTION INTRAVENOUS at 05:04

## 2025-07-28 RX ADMIN — CYCLOBENZAPRINE HYDROCHLORIDE 5 MG: 10 TABLET, FILM COATED ORAL at 18:10

## 2025-07-28 RX ADMIN — BENZOCAINE AND MENTHOL 1 LOZENGE: 15; 2.6 LOZENGE ORAL at 17:04

## 2025-07-28 RX ADMIN — INSULIN ASPART 1 UNITS: 100 INJECTION, SOLUTION INTRAVENOUS; SUBCUTANEOUS at 17:43

## 2025-07-28 RX ADMIN — BENZTROPINE MESYLATE 1 MG: 1 TABLET ORAL at 09:44

## 2025-07-28 RX ADMIN — GABAPENTIN 300 MG: 300 CAPSULE ORAL at 17:04

## 2025-07-28 RX ADMIN — INSULIN ASPART: 100 INJECTION, SOLUTION INTRAVENOUS; SUBCUTANEOUS at 08:02

## 2025-07-28 RX ADMIN — Medication 25 MCG: at 09:44

## 2025-07-28 RX ADMIN — ALLOPURINOL 100 MG: 100 TABLET ORAL at 09:44

## 2025-07-28 RX ADMIN — INSULIN ASPART: 100 INJECTION, SOLUTION INTRAVENOUS; SUBCUTANEOUS at 12:35

## 2025-07-28 RX ADMIN — POLYETHYLENE GLYCOL 3350 17 GRAMS: 17 POWDER, FOR SOLUTION ORAL at 09:45

## 2025-07-28 RX ADMIN — GABAPENTIN 300 MG: 300 CAPSULE ORAL at 09:44

## 2025-07-28 RX ADMIN — QUETIAPINE FUMARATE 100 MG: 100 TABLET ORAL at 18:11

## 2025-07-28 RX ADMIN — FOLIC ACID 1 MG: 1 TABLET ORAL at 09:44

## 2025-07-28 RX ADMIN — Medication: at 09:55

## 2025-07-28 RX ADMIN — BENZTROPINE MESYLATE 1 MG: 1 TABLET ORAL at 20:30

## 2025-07-28 RX ADMIN — ATORVASTATIN CALCIUM 20 MG: 20 TABLET, FILM COATED ORAL at 09:44

## 2025-07-28 RX ADMIN — TAZOBACTAM SODIUM AND PIPERACILLIN SODIUM 50: 500; 4 INJECTION, SOLUTION INTRAVENOUS at 23:27

## 2025-07-28 RX ADMIN — ALLOPURINOL 100 MG: 100 TABLET ORAL at 20:30

## 2025-07-28 RX ADMIN — CITALOPRAM HYDROBROMIDE 10 MG: 10 TABLET ORAL at 18:10

## 2025-07-29 VITALS — SYSTOLIC BLOOD PRESSURE: 166 MMHG | DIASTOLIC BLOOD PRESSURE: 64 MMHG

## 2025-07-29 VITALS — SYSTOLIC BLOOD PRESSURE: 135 MMHG | DIASTOLIC BLOOD PRESSURE: 55 MMHG

## 2025-07-29 VITALS — SYSTOLIC BLOOD PRESSURE: 137 MMHG | DIASTOLIC BLOOD PRESSURE: 52 MMHG

## 2025-07-29 VITALS — HEART RATE: 75 BPM

## 2025-07-29 LAB
BUN SERPL-MCNC: 9 MG/DL (ref 7–17)
CALCIUM SERPL-MCNC: 8.8 MG/DL (ref 8.4–10.2)
CHLORIDE SERPL-SCNC: 113 MMOL/L (ref 98–107)
CO2 SERPL-SCNC: 26 MMOL/L (ref 22–30)
COMMENT: (no result)
CREAT SERPL-MCNC: 1.4 MG/DL (ref 0.6–1)
EGFR: 39.97
ERYTHROCYTE [DISTWIDTH] IN BLOOD BY AUTOMATED COUNT: 15.8 % (ref 11.5–14.5)
ESTIMATED CREATININE CLEARANCE: 38 ML/MIN
GLUCOSE - POINT OF CARE: 112 MG/DL (ref 70–99)
GLUCOSE - POINT OF CARE: 72 MG/DL (ref 70–99)
GLUCOSE - POINT OF CARE: 78 MG/DL (ref 70–99)
GLUCOSE - POINT OF CARE: 91 MG/DL (ref 70–99)
GLUCOSE - POINT OF CARE: 92 MG/DL (ref 70–99)
GLUCOSE SERPL-MCNC: 78 MG/DL (ref 70–99)
HCT VFR BLD AUTO: 26.9 % (ref 37–47)
HGB BLD-MCNC: 8.9 G/DL (ref 12–16)
MCH RBC QN AUTO: 34.6 PG (ref 27–31)
MCHC RBC AUTO-ENTMCNC: 33.1 G/DL (ref 33–37)
MCV RBC AUTO: 104.7 FL (ref 81–99)
PLATELET # BLD AUTO: 65 10^3/UL (ref 130–400)
PMV BLD AUTO: 10.7 FL (ref 7.4–10.4)
POTASSIUM SERPL-SCNC: 4 MMOL/L (ref 3.5–5.1)
RBC # BLD AUTO: 2.57 10^6/UL (ref 4.2–5.4)
SODIUM SERPL-SCNC: 142 MMOL/L (ref 135–145)
WBC # BLD AUTO: 3.2 10^3/UL (ref 4.8–10.8)

## 2025-07-29 RX ADMIN — DIVALPROEX SODIUM 1000 MG: 500 TABLET, FILM COATED, EXTENDED RELEASE ORAL at 18:18

## 2025-07-29 RX ADMIN — CITALOPRAM HYDROBROMIDE 10 MG: 10 TABLET ORAL at 18:19

## 2025-07-29 RX ADMIN — TAZOBACTAM SODIUM AND PIPERACILLIN SODIUM 50: 500; 4 INJECTION, SOLUTION INTRAVENOUS at 05:32

## 2025-07-29 RX ADMIN — CYCLOBENZAPRINE HYDROCHLORIDE 5 MG: 10 TABLET, FILM COATED ORAL at 18:20

## 2025-07-29 RX ADMIN — CARVEDILOL 3.12 MG: 3.12 TABLET, FILM COATED ORAL at 20:07

## 2025-07-29 RX ADMIN — GABAPENTIN 300 MG: 300 CAPSULE ORAL at 08:54

## 2025-07-29 RX ADMIN — ATORVASTATIN CALCIUM 20 MG: 20 TABLET, FILM COATED ORAL at 08:54

## 2025-07-29 RX ADMIN — BENZTROPINE MESYLATE 1 MG: 1 TABLET ORAL at 20:07

## 2025-07-29 RX ADMIN — Medication: at 09:02

## 2025-07-29 RX ADMIN — GABAPENTIN 300 MG: 300 CAPSULE ORAL at 17:42

## 2025-07-29 RX ADMIN — BENZTROPINE MESYLATE 1 MG: 1 TABLET ORAL at 08:54

## 2025-07-29 RX ADMIN — TAZOBACTAM SODIUM AND PIPERACILLIN SODIUM 50: 500; 4 INJECTION, SOLUTION INTRAVENOUS at 11:24

## 2025-07-29 RX ADMIN — INSULIN ASPART: 100 INJECTION, SOLUTION INTRAVENOUS; SUBCUTANEOUS at 17:43

## 2025-07-29 RX ADMIN — INSULIN ASPART: 100 INJECTION, SOLUTION INTRAVENOUS; SUBCUTANEOUS at 08:53

## 2025-07-29 RX ADMIN — QUETIAPINE FUMARATE 100 MG: 100 TABLET ORAL at 18:18

## 2025-07-29 RX ADMIN — GABAPENTIN 300 MG: 300 CAPSULE ORAL at 21:53

## 2025-07-29 RX ADMIN — Medication 250 MG: at 20:07

## 2025-07-29 RX ADMIN — ALLOPURINOL 100 MG: 100 TABLET ORAL at 08:53

## 2025-07-29 RX ADMIN — INSULIN ASPART: 100 INJECTION, SOLUTION INTRAVENOUS; SUBCUTANEOUS at 12:34

## 2025-07-29 RX ADMIN — CARVEDILOL 3.12 MG: 3.12 TABLET, FILM COATED ORAL at 08:53

## 2025-07-29 RX ADMIN — BISACODYL 10 MG: 10 SUPPOSITORY RECTAL at 11:24

## 2025-07-29 RX ADMIN — TAZOBACTAM SODIUM AND PIPERACILLIN SODIUM 50: 500; 4 INJECTION, SOLUTION INTRAVENOUS at 17:42

## 2025-07-29 RX ADMIN — ALLOPURINOL 100 MG: 100 TABLET ORAL at 20:08

## 2025-07-29 RX ADMIN — FOLIC ACID 1 MG: 1 TABLET ORAL at 08:54

## 2025-07-29 RX ADMIN — PANTOPRAZOLE SODIUM 40 MG: 40 TABLET, DELAYED RELEASE ORAL at 08:54

## 2025-07-29 RX ADMIN — TAZOBACTAM SODIUM AND PIPERACILLIN SODIUM 50: 500; 4 INJECTION, SOLUTION INTRAVENOUS at 23:07

## 2025-07-29 RX ADMIN — Medication 250 MG: at 08:54

## 2025-07-29 RX ADMIN — POLYETHYLENE GLYCOL 3350 17 GRAMS: 17 POWDER, FOR SOLUTION ORAL at 08:55

## 2025-07-29 RX ADMIN — LIDOCAINE 3 PATCH: 4 PATCH TOPICAL at 08:55

## 2025-07-30 VITALS — SYSTOLIC BLOOD PRESSURE: 136 MMHG | DIASTOLIC BLOOD PRESSURE: 61 MMHG

## 2025-07-30 VITALS — DIASTOLIC BLOOD PRESSURE: 71 MMHG | SYSTOLIC BLOOD PRESSURE: 165 MMHG

## 2025-07-30 LAB
GLUCOSE - POINT OF CARE: 110 MG/DL (ref 70–99)
GLUCOSE - POINT OF CARE: 73 MG/DL (ref 70–99)

## 2025-07-30 RX ADMIN — TAZOBACTAM SODIUM AND PIPERACILLIN SODIUM 50: 500; 4 INJECTION, SOLUTION INTRAVENOUS at 12:06

## 2025-07-30 RX ADMIN — ALLOPURINOL 100 MG: 100 TABLET ORAL at 09:27

## 2025-07-30 RX ADMIN — ATORVASTATIN CALCIUM 20 MG: 20 TABLET, FILM COATED ORAL at 09:27

## 2025-07-30 RX ADMIN — INSULIN ASPART: 100 INJECTION, SOLUTION INTRAVENOUS; SUBCUTANEOUS at 06:31

## 2025-07-30 RX ADMIN — Medication 250 MG: at 09:28

## 2025-07-30 RX ADMIN — INSULIN ASPART: 100 INJECTION, SOLUTION INTRAVENOUS; SUBCUTANEOUS at 11:43

## 2025-07-30 RX ADMIN — LIDOCAINE 3 PATCH: 4 PATCH TOPICAL at 09:27

## 2025-07-30 RX ADMIN — GABAPENTIN 300 MG: 300 CAPSULE ORAL at 15:27

## 2025-07-30 RX ADMIN — Medication 25 MCG: at 09:27

## 2025-07-30 RX ADMIN — CARVEDILOL 3.12 MG: 3.12 TABLET, FILM COATED ORAL at 09:27

## 2025-07-30 RX ADMIN — FOLIC ACID 1 MG: 1 TABLET ORAL at 09:27

## 2025-07-30 RX ADMIN — GABAPENTIN 300 MG: 300 CAPSULE ORAL at 09:28

## 2025-07-30 RX ADMIN — ACETAMINOPHEN 650 MG: 325 TABLET ORAL at 12:49

## 2025-07-30 RX ADMIN — PANTOPRAZOLE SODIUM 40 MG: 40 TABLET, DELAYED RELEASE ORAL at 09:27

## 2025-07-30 RX ADMIN — BENZTROPINE MESYLATE 1 MG: 1 TABLET ORAL at 09:27

## 2025-07-30 RX ADMIN — POLYETHYLENE GLYCOL 3350 17 GRAMS: 17 POWDER, FOR SOLUTION ORAL at 09:27

## 2025-07-30 RX ADMIN — TAZOBACTAM SODIUM AND PIPERACILLIN SODIUM 50: 500; 4 INJECTION, SOLUTION INTRAVENOUS at 05:13

## (undated) DEVICE — UTERINE MANIPULATOR RUMI 5.1 X 6 CM

## (undated) DEVICE — VISUALIZATION SYSTEM: Brand: CLEARIFY

## (undated) DEVICE — LARGE NEEDLE DRIVER: Brand: ENDOWRIST

## (undated) DEVICE — ANTIBACTERIAL VIOLET BRAIDED (POLYGLACTIN 910), SYNTHETIC ABSORBABLE SUTURE: Brand: COATED VICRYL

## (undated) DEVICE — INTENDED FOR TISSUE SEPARATION, AND OTHER PROCEDURES THAT REQUIRE A SHARP SURGICAL BLADE TO PUNCTURE OR CUT.: Brand: BARD-PARKER SAFETY BLADES SIZE 11, STERILE

## (undated) DEVICE — ARM DRAPE

## (undated) DEVICE — GLOVE PI ULTRA TOUCH SZ.7.5

## (undated) DEVICE — GLOVE INDICATOR PI UNDERGLOVE SZ 8 BLUE

## (undated) DEVICE — TRAY FOLEY 16FR URIMETER SILICONE SURESTEP

## (undated) DEVICE — SUT STRATAFIX SPIRAL 2-0 CT-1 30 CM SXPP1B410

## (undated) DEVICE — TIP COVER ACCESSORY

## (undated) DEVICE — SUT MONOCRYL 4-0 PS-2 27 IN Y426H

## (undated) DEVICE — CHLORHEXIDINE 4PCT 4 OZ

## (undated) DEVICE — MAYO STAND COVER: Brand: CONVERTORS

## (undated) DEVICE — CANNULA SEAL

## (undated) DEVICE — OCCLUDER COLPO-PNEUMO

## (undated) DEVICE — BLADELESS OBTURATOR: Brand: WECK VISTA

## (undated) DEVICE — PROGRASP FORCEPS: Brand: ENDOWRIST

## (undated) DEVICE — PREMIUM DRY TRAY LF: Brand: MEDLINE INDUSTRIES, INC.

## (undated) DEVICE — MONOPOLAR CURVED SCISSORS: Brand: ENDOWRIST

## (undated) DEVICE — COLUMN DRAPE

## (undated) DEVICE — 40595 XL TRENDELENBURG POSITIONING KIT: Brand: 40595 XL TRENDELENBURG POSITIONING KIT

## (undated) DEVICE — ADHESIVE SKIN HIGH VISCOSITY EXOFIN 1ML

## (undated) DEVICE — GLOVE INDICATOR PI UNDERGLOVE SZ 7 BLUE

## (undated) DEVICE — 1820 FOAM BLOCK NEEDLE COUNTER: Brand: DEVON

## (undated) DEVICE — FENESTRATED BIPOLAR FORCEPS: Brand: ENDOWRIST

## (undated) DEVICE — AIRSEAL TUBE SMOKE EVAC LUMENX3 FILTERED

## (undated) DEVICE — KIT, BETHLEHEM ROBOTIC PROST: Brand: CARDINAL HEALTH

## (undated) DEVICE — SYRINGE 50ML LL

## (undated) DEVICE — TROCAR PORT ACCESS 5 X120MML W/BLDLS OPTICAL TIP AIRSEAL

## (undated) DEVICE — ELECTRO LUBE IS A SINGLE PATIENT USE DEVICE THAT IS INTENDED TO BE USED ON ELECTROSURGICAL ELECTRODES TO REDUCE STICKING.: Brand: KEY SURGICAL ELECTRO LUBE

## (undated) DEVICE — INTENDED FOR TISSUE SEPARATION, AND OTHER PROCEDURES THAT REQUIRE A SHARP SURGICAL BLADE TO PUNCTURE OR CUT.: Brand: BARD-PARKER SAFETY BLADES SIZE 15, STERILE